# Patient Record
Sex: MALE | Race: WHITE | HISPANIC OR LATINO | Employment: OTHER | URBAN - METROPOLITAN AREA
[De-identification: names, ages, dates, MRNs, and addresses within clinical notes are randomized per-mention and may not be internally consistent; named-entity substitution may affect disease eponyms.]

---

## 2024-01-01 ENCOUNTER — APPOINTMENT (OUTPATIENT)
Dept: CT IMAGING | Facility: CLINIC | Age: 89
End: 2024-01-01
Attending: EMERGENCY MEDICINE
Payer: COMMERCIAL

## 2024-01-01 ENCOUNTER — HOSPITAL ENCOUNTER (INPATIENT)
Facility: HOSPITAL | Age: 89
LOS: 5 days | DRG: 064 | End: 2024-02-08
Attending: STUDENT IN AN ORGANIZED HEALTH CARE EDUCATION/TRAINING PROGRAM | Admitting: STUDENT IN AN ORGANIZED HEALTH CARE EDUCATION/TRAINING PROGRAM
Payer: COMMERCIAL

## 2024-01-01 ENCOUNTER — APPOINTMENT (OUTPATIENT)
Dept: MRI IMAGING | Facility: CLINIC | Age: 89
End: 2024-01-01
Attending: EMERGENCY MEDICINE
Payer: COMMERCIAL

## 2024-01-01 ENCOUNTER — LAB REQUISITION (OUTPATIENT)
Dept: LAB | Facility: CLINIC | Age: 89
End: 2024-01-01

## 2024-01-01 ENCOUNTER — APPOINTMENT (OUTPATIENT)
Dept: CT IMAGING | Facility: HOSPITAL | Age: 89
DRG: 064 | End: 2024-01-01
Attending: STUDENT IN AN ORGANIZED HEALTH CARE EDUCATION/TRAINING PROGRAM
Payer: COMMERCIAL

## 2024-01-01 ENCOUNTER — HOSPITAL ENCOUNTER (EMERGENCY)
Facility: CLINIC | Age: 89
Discharge: OTHER INSTITUTION | End: 2024-02-03
Attending: EMERGENCY MEDICINE | Admitting: EMERGENCY MEDICINE
Payer: COMMERCIAL

## 2024-01-01 VITALS
OXYGEN SATURATION: 100 % | TEMPERATURE: 98.5 F | BODY MASS INDEX: 24.61 KG/M2 | HEIGHT: 65 IN | SYSTOLIC BLOOD PRESSURE: 150 MMHG | WEIGHT: 147.71 LBS | DIASTOLIC BLOOD PRESSURE: 71 MMHG | HEART RATE: 88 BPM

## 2024-01-01 VITALS
HEIGHT: 65 IN | BODY MASS INDEX: 24.66 KG/M2 | OXYGEN SATURATION: 96 % | TEMPERATURE: 100.2 F | WEIGHT: 148 LBS | RESPIRATION RATE: 16 BRPM | SYSTOLIC BLOOD PRESSURE: 146 MMHG | HEART RATE: 79 BPM | DIASTOLIC BLOOD PRESSURE: 73 MMHG

## 2024-01-01 DIAGNOSIS — Z11.1 ENCOUNTER FOR SCREENING FOR RESPIRATORY TUBERCULOSIS: ICD-10-CM

## 2024-01-01 DIAGNOSIS — I63.9 ACUTE CVA (CEREBROVASCULAR ACCIDENT) (H): ICD-10-CM

## 2024-01-01 DIAGNOSIS — Z86.59 HISTORY OF DEMENTIA: ICD-10-CM

## 2024-01-01 DIAGNOSIS — G83.84 TODD'S PARALYSIS (POSTEPILEPTIC) (H): ICD-10-CM

## 2024-01-01 LAB
ALBUMIN SERPL BCG-MCNC: 3.3 G/DL (ref 3.5–5.2)
ALP SERPL-CCNC: 161 U/L (ref 40–150)
ALT SERPL W P-5'-P-CCNC: 41 U/L (ref 0–70)
ANION GAP SERPL CALCULATED.3IONS-SCNC: 12 MMOL/L (ref 7–15)
ANION GAP SERPL CALCULATED.3IONS-SCNC: 8 MMOL/L (ref 7–15)
APTT PPP: 37 SECONDS (ref 22–38)
AST SERPL W P-5'-P-CCNC: 38 U/L (ref 0–45)
ATRIAL RATE - MUSE: 102 BPM
BASOPHILS # BLD AUTO: 0.1 10E3/UL (ref 0–0.2)
BASOPHILS NFR BLD AUTO: 0 %
BILIRUB SERPL-MCNC: 0.6 MG/DL
BUN SERPL-MCNC: 22.7 MG/DL (ref 8–23)
BUN SERPL-MCNC: 22.9 MG/DL (ref 8–23)
CALCIUM SERPL-MCNC: 8.3 MG/DL (ref 8.8–10.2)
CALCIUM SERPL-MCNC: 8.9 MG/DL (ref 8.8–10.2)
CHLORIDE SERPL-SCNC: 102 MMOL/L (ref 98–107)
CHLORIDE SERPL-SCNC: 96 MMOL/L (ref 98–107)
CHOLEST SERPL-MCNC: 87 MG/DL
CK SERPL-CCNC: 122 U/L (ref 39–308)
CREAT SERPL-MCNC: 1.24 MG/DL (ref 0.67–1.17)
CREAT SERPL-MCNC: 1.29 MG/DL (ref 0.67–1.17)
DEPRECATED HCO3 PLAS-SCNC: 25 MMOL/L (ref 22–29)
DEPRECATED HCO3 PLAS-SCNC: 26 MMOL/L (ref 22–29)
DIASTOLIC BLOOD PRESSURE - MUSE: NORMAL MMHG
EGFRCR SERPLBLD CKD-EPI 2021: 54 ML/MIN/1.73M2
EGFRCR SERPLBLD CKD-EPI 2021: 57 ML/MIN/1.73M2
EOSINOPHIL # BLD AUTO: 0 10E3/UL (ref 0–0.7)
EOSINOPHIL NFR BLD AUTO: 0 %
ERYTHROCYTE [DISTWIDTH] IN BLOOD BY AUTOMATED COUNT: 12.7 % (ref 10–15)
ERYTHROCYTE [DISTWIDTH] IN BLOOD BY AUTOMATED COUNT: 12.8 % (ref 10–15)
GAMMA INTERFERON BACKGROUND BLD IA-ACNC: 0 IU/ML
GLUCOSE BLDC GLUCOMTR-MCNC: 133 MG/DL (ref 70–99)
GLUCOSE BLDC GLUCOMTR-MCNC: 146 MG/DL (ref 70–99)
GLUCOSE SERPL-MCNC: 124 MG/DL (ref 70–99)
GLUCOSE SERPL-MCNC: 135 MG/DL (ref 70–99)
HBA1C MFR BLD: 6.4 %
HCT VFR BLD AUTO: 38.9 % (ref 40–53)
HCT VFR BLD AUTO: 41.3 % (ref 40–53)
HDLC SERPL-MCNC: 33 MG/DL
HGB BLD-MCNC: 13.2 G/DL (ref 13.3–17.7)
HGB BLD-MCNC: 13.8 G/DL (ref 13.3–17.7)
HOLD SPECIMEN: NORMAL
IMM GRANULOCYTES # BLD: 0.1 10E3/UL
IMM GRANULOCYTES NFR BLD: 1 %
INR PPP: 1.24 (ref 0.85–1.15)
INTERPRETATION ECG - MUSE: NORMAL
LDLC SERPL CALC-MCNC: 36 MG/DL
LYMPHOCYTES # BLD AUTO: 2.1 10E3/UL (ref 0.8–5.3)
LYMPHOCYTES NFR BLD AUTO: 12 %
M TB IFN-G BLD-IMP: NEGATIVE
M TB IFN-G CD4+ BCKGRND COR BLD-ACNC: 0.81 IU/ML
MCH RBC QN AUTO: 29.3 PG (ref 26.5–33)
MCH RBC QN AUTO: 29.4 PG (ref 26.5–33)
MCHC RBC AUTO-ENTMCNC: 33.4 G/DL (ref 31.5–36.5)
MCHC RBC AUTO-ENTMCNC: 33.9 G/DL (ref 31.5–36.5)
MCV RBC AUTO: 86 FL (ref 78–100)
MCV RBC AUTO: 88 FL (ref 78–100)
MITOGEN IGNF BCKGRD COR BLD-ACNC: 0.01 IU/ML
MITOGEN IGNF BCKGRD COR BLD-ACNC: 0.01 IU/ML
MONOCYTES # BLD AUTO: 1.4 10E3/UL (ref 0–1.3)
MONOCYTES NFR BLD AUTO: 8 %
NEUTROPHILS # BLD AUTO: 13.3 10E3/UL (ref 1.6–8.3)
NEUTROPHILS NFR BLD AUTO: 79 %
NONHDLC SERPL-MCNC: 54 MG/DL
NRBC # BLD AUTO: 0 10E3/UL
NRBC BLD AUTO-RTO: 0 /100
P AXIS - MUSE: -13 DEGREES
PLATELET # BLD AUTO: 332 10E3/UL (ref 150–450)
PLATELET # BLD AUTO: 358 10E3/UL (ref 150–450)
POTASSIUM SERPL-SCNC: 4 MMOL/L (ref 3.4–5.3)
POTASSIUM SERPL-SCNC: 4.5 MMOL/L (ref 3.4–5.3)
PR INTERVAL - MUSE: 150 MS
PROT SERPL-MCNC: 7.1 G/DL (ref 6.4–8.3)
QRS DURATION - MUSE: 78 MS
QT - MUSE: 354 MS
QTC - MUSE: 461 MS
QUANTIFERON MITOGEN: 0.81 IU/ML
QUANTIFERON NIL TUBE: 0 IU/ML
QUANTIFERON TB1 TUBE: 0.01 IU/ML
QUANTIFERON TB2 TUBE: 0.01
R AXIS - MUSE: 49 DEGREES
RBC # BLD AUTO: 4.5 10E6/UL (ref 4.4–5.9)
RBC # BLD AUTO: 4.7 10E6/UL (ref 4.4–5.9)
SODIUM SERPL-SCNC: 133 MMOL/L (ref 135–145)
SODIUM SERPL-SCNC: 136 MMOL/L (ref 135–145)
SYSTOLIC BLOOD PRESSURE - MUSE: NORMAL MMHG
T AXIS - MUSE: 100 DEGREES
TRIGL SERPL-MCNC: 88 MG/DL
TROPONIN T SERPL HS-MCNC: 14 NG/L
TROPONIN T SERPL HS-MCNC: 17 NG/L
VENTRICULAR RATE- MUSE: 102 BPM
WBC # BLD AUTO: 16.9 10E3/UL (ref 4–11)
WBC # BLD AUTO: 17.2 10E3/UL (ref 4–11)

## 2024-01-01 PROCEDURE — 84484 ASSAY OF TROPONIN QUANT: CPT | Performed by: STUDENT IN AN ORGANIZED HEALTH CARE EDUCATION/TRAINING PROGRAM

## 2024-01-01 PROCEDURE — 258N000003 HC RX IP 258 OP 636: Performed by: EMERGENCY MEDICINE

## 2024-01-01 PROCEDURE — 82962 GLUCOSE BLOOD TEST: CPT

## 2024-01-01 PROCEDURE — 250N000011 HC RX IP 250 OP 636: Mod: JZ | Performed by: PSYCHIATRY & NEUROLOGY

## 2024-01-01 PROCEDURE — 250N000013 HC RX MED GY IP 250 OP 250 PS 637: Performed by: INTERNAL MEDICINE

## 2024-01-01 PROCEDURE — 80061 LIPID PANEL: CPT | Performed by: STUDENT IN AN ORGANIZED HEALTH CARE EDUCATION/TRAINING PROGRAM

## 2024-01-01 PROCEDURE — 99232 SBSQ HOSP IP/OBS MODERATE 35: CPT | Performed by: INTERNAL MEDICINE

## 2024-01-01 PROCEDURE — 999N000226 HC STATISTIC SLP IP EVAL DEFER

## 2024-01-01 PROCEDURE — 85027 COMPLETE CBC AUTOMATED: CPT | Performed by: STUDENT IN AN ORGANIZED HEALTH CARE EDUCATION/TRAINING PROGRAM

## 2024-01-01 PROCEDURE — 255N000002 HC RX 255 OP 636: Performed by: EMERGENCY MEDICINE

## 2024-01-01 PROCEDURE — 86481 TB AG RESPONSE T-CELL SUSP: CPT | Performed by: INTERNAL MEDICINE

## 2024-01-01 PROCEDURE — 99233 SBSQ HOSP IP/OBS HIGH 50: CPT | Performed by: INTERNAL MEDICINE

## 2024-01-01 PROCEDURE — 93005 ELECTROCARDIOGRAM TRACING: CPT

## 2024-01-01 PROCEDURE — 70496 CT ANGIOGRAPHY HEAD: CPT

## 2024-01-01 PROCEDURE — 250N000011 HC RX IP 250 OP 636: Performed by: EMERGENCY MEDICINE

## 2024-01-01 PROCEDURE — 250N000013 HC RX MED GY IP 250 OP 250 PS 637: Performed by: FAMILY MEDICINE

## 2024-01-01 PROCEDURE — 84484 ASSAY OF TROPONIN QUANT: CPT | Performed by: EMERGENCY MEDICINE

## 2024-01-01 PROCEDURE — 96365 THER/PROPH/DIAG IV INF INIT: CPT | Mod: 59

## 2024-01-01 PROCEDURE — 258N000003 HC RX IP 258 OP 636: Performed by: PSYCHIATRY & NEUROLOGY

## 2024-01-01 PROCEDURE — 250N000013 HC RX MED GY IP 250 OP 250 PS 637: Performed by: STUDENT IN AN ORGANIZED HEALTH CARE EDUCATION/TRAINING PROGRAM

## 2024-01-01 PROCEDURE — 36415 COLL VENOUS BLD VENIPUNCTURE: CPT | Performed by: STUDENT IN AN ORGANIZED HEALTH CARE EDUCATION/TRAINING PROGRAM

## 2024-01-01 PROCEDURE — 99418 PROLNG IP/OBS E/M EA 15 MIN: CPT | Performed by: STUDENT IN AN ORGANIZED HEALTH CARE EDUCATION/TRAINING PROGRAM

## 2024-01-01 PROCEDURE — 70450 CT HEAD/BRAIN W/O DYE: CPT

## 2024-01-01 PROCEDURE — 120N000001 HC R&B MED SURG/OB

## 2024-01-01 PROCEDURE — 99207 PR APP CREDIT; MD BILLING SHARED VISIT: CPT | Performed by: FAMILY MEDICINE

## 2024-01-01 PROCEDURE — 85730 THROMBOPLASTIN TIME PARTIAL: CPT | Performed by: EMERGENCY MEDICINE

## 2024-01-01 PROCEDURE — 99223 1ST HOSP IP/OBS HIGH 75: CPT | Performed by: PSYCHIATRY & NEUROLOGY

## 2024-01-01 PROCEDURE — 250N000011 HC RX IP 250 OP 636: Performed by: STUDENT IN AN ORGANIZED HEALTH CARE EDUCATION/TRAINING PROGRAM

## 2024-01-01 PROCEDURE — 36415 COLL VENOUS BLD VENIPUNCTURE: CPT | Performed by: INTERNAL MEDICINE

## 2024-01-01 PROCEDURE — 36415 COLL VENOUS BLD VENIPUNCTURE: CPT | Performed by: EMERGENCY MEDICINE

## 2024-01-01 PROCEDURE — 99223 1ST HOSP IP/OBS HIGH 75: CPT | Performed by: STUDENT IN AN ORGANIZED HEALTH CARE EDUCATION/TRAINING PROGRAM

## 2024-01-01 PROCEDURE — 99238 HOSP IP/OBS DSCHRG MGMT 30/<: CPT | Performed by: INTERNAL MEDICINE

## 2024-01-01 PROCEDURE — A9585 GADOBUTROL INJECTION: HCPCS | Performed by: EMERGENCY MEDICINE

## 2024-01-01 PROCEDURE — 85610 PROTHROMBIN TIME: CPT | Performed by: EMERGENCY MEDICINE

## 2024-01-01 PROCEDURE — 80053 COMPREHEN METABOLIC PANEL: CPT | Performed by: STUDENT IN AN ORGANIZED HEALTH CARE EDUCATION/TRAINING PROGRAM

## 2024-01-01 PROCEDURE — 2894A VOIDCORRECT: CPT | Performed by: INTERNAL MEDICINE

## 2024-01-01 PROCEDURE — 83036 HEMOGLOBIN GLYCOSYLATED A1C: CPT | Performed by: STUDENT IN AN ORGANIZED HEALTH CARE EDUCATION/TRAINING PROGRAM

## 2024-01-01 PROCEDURE — P9604 ONE-WAY ALLOW PRORATED TRIP: HCPCS | Performed by: INTERNAL MEDICINE

## 2024-01-01 PROCEDURE — 99285 EMERGENCY DEPT VISIT HI MDM: CPT | Mod: 25

## 2024-01-01 PROCEDURE — 70553 MRI BRAIN STEM W/O & W/DYE: CPT

## 2024-01-01 PROCEDURE — 85025 COMPLETE CBC W/AUTO DIFF WBC: CPT | Performed by: EMERGENCY MEDICINE

## 2024-01-01 PROCEDURE — 250N000009 HC RX 250: Performed by: EMERGENCY MEDICINE

## 2024-01-01 PROCEDURE — 80048 BASIC METABOLIC PNL TOTAL CA: CPT | Performed by: EMERGENCY MEDICINE

## 2024-01-01 PROCEDURE — 82550 ASSAY OF CK (CPK): CPT | Performed by: STUDENT IN AN ORGANIZED HEALTH CARE EDUCATION/TRAINING PROGRAM

## 2024-01-01 RX ORDER — ASPIRIN 81 MG/1
81 TABLET, CHEWABLE ORAL DAILY
Status: DISCONTINUED | OUTPATIENT
Start: 2024-01-01 | End: 2024-01-01

## 2024-01-01 RX ORDER — GADOBUTROL 604.72 MG/ML
7 INJECTION INTRAVENOUS ONCE
Status: COMPLETED | OUTPATIENT
Start: 2024-01-01 | End: 2024-01-01

## 2024-01-01 RX ORDER — MORPHINE SULFATE 10 MG/5ML
5 SOLUTION ORAL
Status: DISCONTINUED | OUTPATIENT
Start: 2024-01-01 | End: 2024-01-01 | Stop reason: HOSPADM

## 2024-01-01 RX ORDER — ASPIRIN 81 MG/1
81 TABLET ORAL DAILY
Status: DISCONTINUED | OUTPATIENT
Start: 2024-01-01 | End: 2024-01-01

## 2024-01-01 RX ORDER — LORAZEPAM 1 MG/1
1 TABLET ORAL
Status: DISCONTINUED | OUTPATIENT
Start: 2024-01-01 | End: 2024-01-01

## 2024-01-01 RX ORDER — SALIVA STIMULANT COMB. NO.3
2 SPRAY, NON-AEROSOL (ML) MUCOUS MEMBRANE
Status: DISCONTINUED | OUTPATIENT
Start: 2024-01-01 | End: 2024-01-01 | Stop reason: HOSPADM

## 2024-01-01 RX ORDER — NALOXONE HYDROCHLORIDE 0.4 MG/ML
0.1 INJECTION, SOLUTION INTRAMUSCULAR; INTRAVENOUS; SUBCUTANEOUS
Status: DISCONTINUED | OUTPATIENT
Start: 2024-01-01 | End: 2024-01-01

## 2024-01-01 RX ORDER — ACETAMINOPHEN 650 MG/1
650 SUPPOSITORY RECTAL EVERY 6 HOURS PRN
Status: DISCONTINUED | OUTPATIENT
Start: 2024-01-01 | End: 2024-01-01

## 2024-01-01 RX ORDER — NALOXONE HYDROCHLORIDE 0.4 MG/ML
0.4 INJECTION, SOLUTION INTRAMUSCULAR; INTRAVENOUS; SUBCUTANEOUS
Status: DISCONTINUED | OUTPATIENT
Start: 2024-01-01 | End: 2024-01-01 | Stop reason: HOSPADM

## 2024-01-01 RX ORDER — MORPHINE SULFATE 20 MG/ML
10 SOLUTION ORAL EVERY 4 HOURS
Status: DISCONTINUED | OUTPATIENT
Start: 2024-01-01 | End: 2024-01-01 | Stop reason: HOSPADM

## 2024-01-01 RX ORDER — ATORVASTATIN CALCIUM 40 MG/1
80 TABLET, FILM COATED ORAL EVERY EVENING
Status: DISCONTINUED | OUTPATIENT
Start: 2024-01-01 | End: 2024-01-01

## 2024-01-01 RX ORDER — LIDOCAINE 40 MG/G
CREAM TOPICAL
Status: DISCONTINUED | OUTPATIENT
Start: 2024-01-01 | End: 2024-01-01

## 2024-01-01 RX ORDER — POLYETHYLENE GLYCOL 3350 17 G/17G
17 POWDER, FOR SOLUTION ORAL 2 TIMES DAILY PRN
Status: DISCONTINUED | OUTPATIENT
Start: 2024-01-01 | End: 2024-01-01 | Stop reason: HOSPADM

## 2024-01-01 RX ORDER — CARBOXYMETHYLCELLULOSE SODIUM 5 MG/ML
1-2 SOLUTION/ DROPS OPHTHALMIC
Status: DISCONTINUED | OUTPATIENT
Start: 2024-01-01 | End: 2024-01-01 | Stop reason: HOSPADM

## 2024-01-01 RX ORDER — MEMANTINE HYDROCHLORIDE 10 MG/1
10 TABLET ORAL 2 TIMES DAILY
COMMUNITY
Start: 2023-01-01

## 2024-01-01 RX ORDER — OLANZAPINE 5 MG/1
5 TABLET, ORALLY DISINTEGRATING ORAL EVERY 6 HOURS PRN
Status: DISCONTINUED | OUTPATIENT
Start: 2024-01-01 | End: 2024-01-01 | Stop reason: HOSPADM

## 2024-01-01 RX ORDER — ASPIRIN 81 MG/1
81 TABLET, CHEWABLE ORAL DAILY
Status: DISCONTINUED | OUTPATIENT
Start: 2024-01-01 | End: 2024-01-01 | Stop reason: DRUGHIGH

## 2024-01-01 RX ORDER — LORAZEPAM 1 MG/1
1 TABLET ORAL EVERY 4 HOURS
Status: DISCONTINUED | OUTPATIENT
Start: 2024-01-01 | End: 2024-01-01

## 2024-01-01 RX ORDER — LIDOCAINE 40 MG/G
CREAM TOPICAL
Status: DISCONTINUED | OUTPATIENT
Start: 2024-01-01 | End: 2024-01-01 | Stop reason: HOSPADM

## 2024-01-01 RX ORDER — ASPIRIN 300 MG/1
300 SUPPOSITORY RECTAL DAILY
Status: DISCONTINUED | OUTPATIENT
Start: 2024-01-01 | End: 2024-01-01

## 2024-01-01 RX ORDER — MORPHINE SULFATE 20 MG/ML
10 SOLUTION ORAL
Status: DISCONTINUED | OUTPATIENT
Start: 2024-01-01 | End: 2024-01-01 | Stop reason: HOSPADM

## 2024-01-01 RX ORDER — CLOPIDOGREL BISULFATE 75 MG/1
75 TABLET ORAL EVERY EVENING
COMMUNITY
Start: 2023-01-01

## 2024-01-01 RX ORDER — NALOXONE HYDROCHLORIDE 0.4 MG/ML
0.2 INJECTION, SOLUTION INTRAMUSCULAR; INTRAVENOUS; SUBCUTANEOUS
Status: DISCONTINUED | OUTPATIENT
Start: 2024-01-01 | End: 2024-01-01 | Stop reason: HOSPADM

## 2024-01-01 RX ORDER — ACETAMINOPHEN 500 MG
500-1000 TABLET ORAL 2 TIMES DAILY PRN
COMMUNITY

## 2024-01-01 RX ORDER — BISACODYL 10 MG
10 SUPPOSITORY, RECTAL RECTAL
Status: DISCONTINUED | OUTPATIENT
Start: 2024-01-01 | End: 2024-01-01 | Stop reason: HOSPADM

## 2024-01-01 RX ORDER — ROSUVASTATIN CALCIUM 40 MG/1
40 TABLET, COATED ORAL AT BEDTIME
COMMUNITY
Start: 2023-01-01

## 2024-01-01 RX ORDER — ATROPINE SULFATE 10 MG/ML
2 SOLUTION/ DROPS OPHTHALMIC EVERY 4 HOURS PRN
Status: DISCONTINUED | OUTPATIENT
Start: 2024-01-01 | End: 2024-01-01 | Stop reason: HOSPADM

## 2024-01-01 RX ORDER — LEVETIRACETAM 100 MG/ML
500 SOLUTION ORAL 2 TIMES DAILY
Status: DISCONTINUED | OUTPATIENT
Start: 2024-01-01 | End: 2024-01-01

## 2024-01-01 RX ORDER — SENNOSIDES A AND B 8.6 MG/1
8.6 TABLET, FILM COATED ORAL 2 TIMES DAILY
COMMUNITY
Start: 2024-01-01

## 2024-01-01 RX ORDER — POLYETHYLENE GLYCOL 3350 17 G/17G
1 POWDER, FOR SOLUTION ORAL DAILY PRN
COMMUNITY

## 2024-01-01 RX ORDER — MORPHINE SULFATE 20 MG/ML
5 SOLUTION ORAL
Status: DISCONTINUED | OUTPATIENT
Start: 2024-01-01 | End: 2024-01-01 | Stop reason: HOSPADM

## 2024-01-01 RX ORDER — LEVOTHYROXINE SODIUM 50 UG/1
50 TABLET ORAL
COMMUNITY
Start: 2023-01-01

## 2024-01-01 RX ORDER — PROCHLORPERAZINE 25 MG
12.5 SUPPOSITORY, RECTAL RECTAL EVERY 12 HOURS PRN
Status: DISCONTINUED | OUTPATIENT
Start: 2024-01-01 | End: 2024-01-01 | Stop reason: HOSPADM

## 2024-01-01 RX ORDER — LEVETIRACETAM 500 MG/1
500 TABLET ORAL 2 TIMES DAILY
COMMUNITY
Start: 2023-01-01

## 2024-01-01 RX ORDER — LEVETIRACETAM 100 MG/ML
750 SOLUTION ORAL 2 TIMES DAILY
Status: DISCONTINUED | OUTPATIENT
Start: 2024-01-01 | End: 2024-01-01

## 2024-01-01 RX ORDER — BISACODYL 10 MG
10 SUPPOSITORY, RECTAL RECTAL DAILY PRN
Status: DISCONTINUED | OUTPATIENT
Start: 2024-01-01 | End: 2024-01-01 | Stop reason: HOSPADM

## 2024-01-01 RX ORDER — LORAZEPAM 2 MG/ML
1 CONCENTRATE ORAL EVERY 4 HOURS
Status: DISCONTINUED | OUTPATIENT
Start: 2024-01-01 | End: 2024-01-01 | Stop reason: HOSPADM

## 2024-01-01 RX ORDER — ACETAMINOPHEN 325 MG/1
650 TABLET ORAL EVERY 4 HOURS PRN
Status: DISCONTINUED | OUTPATIENT
Start: 2024-01-01 | End: 2024-01-01 | Stop reason: HOSPADM

## 2024-01-01 RX ORDER — ACETAMINOPHEN 650 MG/1
650 SUPPOSITORY RECTAL EVERY 4 HOURS PRN
Status: DISCONTINUED | OUTPATIENT
Start: 2024-01-01 | End: 2024-01-01 | Stop reason: HOSPADM

## 2024-01-01 RX ORDER — IOPAMIDOL 755 MG/ML
500 INJECTION, SOLUTION INTRAVASCULAR ONCE
Status: COMPLETED | OUTPATIENT
Start: 2024-01-01 | End: 2024-01-01

## 2024-01-01 RX ORDER — LORAZEPAM 2 MG/ML
1 INJECTION INTRAMUSCULAR
Status: DISCONTINUED | OUTPATIENT
Start: 2024-01-01 | End: 2024-01-01

## 2024-01-01 RX ORDER — LEVETIRACETAM 15 MG/ML
1500 INJECTION INTRAVASCULAR ONCE
Status: DISCONTINUED | OUTPATIENT
Start: 2024-01-01 | End: 2024-01-01

## 2024-01-01 RX ORDER — PROCHLORPERAZINE MALEATE 5 MG
5 TABLET ORAL EVERY 6 HOURS PRN
Status: DISCONTINUED | OUTPATIENT
Start: 2024-01-01 | End: 2024-01-01 | Stop reason: HOSPADM

## 2024-01-01 RX ORDER — ONDANSETRON 2 MG/ML
4 INJECTION INTRAMUSCULAR; INTRAVENOUS EVERY 6 HOURS PRN
Status: DISCONTINUED | OUTPATIENT
Start: 2024-01-01 | End: 2024-01-01

## 2024-01-01 RX ORDER — ONDANSETRON 4 MG/1
4 TABLET, ORALLY DISINTEGRATING ORAL EVERY 6 HOURS PRN
Status: DISCONTINUED | OUTPATIENT
Start: 2024-01-01 | End: 2024-01-01 | Stop reason: HOSPADM

## 2024-01-01 RX ORDER — ASPIRIN 81 MG/1
81 TABLET ORAL DAILY
Status: DISCONTINUED | OUTPATIENT
Start: 2024-01-01 | End: 2024-01-01 | Stop reason: DRUGHIGH

## 2024-01-01 RX ORDER — MORPHINE SULFATE 10 MG/5ML
10 SOLUTION ORAL
Status: DISCONTINUED | OUTPATIENT
Start: 2024-01-01 | End: 2024-01-01 | Stop reason: HOSPADM

## 2024-01-01 RX ORDER — ACETAMINOPHEN 500 MG
500-1000 TABLET ORAL 3 TIMES DAILY
COMMUNITY

## 2024-01-01 RX ORDER — NALOXONE HYDROCHLORIDE 0.4 MG/ML
0.2 INJECTION, SOLUTION INTRAMUSCULAR; INTRAVENOUS; SUBCUTANEOUS
Status: DISCONTINUED | OUTPATIENT
Start: 2024-01-01 | End: 2024-01-01

## 2024-01-01 RX ORDER — MINERAL OIL/HYDROPHIL PETROLAT
OINTMENT (GRAM) TOPICAL
Status: DISCONTINUED | OUTPATIENT
Start: 2024-01-01 | End: 2024-01-01 | Stop reason: HOSPADM

## 2024-01-01 RX ADMIN — ONDANSETRON 4 MG: 4 TABLET, ORALLY DISINTEGRATING ORAL at 11:16

## 2024-01-01 RX ADMIN — MORPHINE SULFATE 5 MG: 20 SOLUTION ORAL at 19:15

## 2024-01-01 RX ADMIN — LORAZEPAM 1 MG: 2 CONCENTRATE ORAL at 19:59

## 2024-01-01 RX ADMIN — LORAZEPAM 1 MG: 2 CONCENTRATE ORAL at 03:53

## 2024-01-01 RX ADMIN — MORPHINE SULFATE 10 MG: 20 SOLUTION ORAL at 01:36

## 2024-01-01 RX ADMIN — ACETAMINOPHEN 650 MG: 650 SUPPOSITORY RECTAL at 19:24

## 2024-01-01 RX ADMIN — MORPHINE SULFATE 5 MG: 20 SOLUTION ORAL at 16:38

## 2024-01-01 RX ADMIN — LORAZEPAM 1 MG: 2 CONCENTRATE ORAL at 15:46

## 2024-01-01 RX ADMIN — ACETAMINOPHEN 650 MG: 650 SUPPOSITORY RECTAL at 10:49

## 2024-01-01 RX ADMIN — MORPHINE SULFATE 5 MG: 10 SOLUTION ORAL at 18:21

## 2024-01-01 RX ADMIN — MORPHINE SULFATE 5 MG: 20 SOLUTION ORAL at 11:16

## 2024-01-01 RX ADMIN — SODIUM CHLORIDE 3000 MG: 9 INJECTION, SOLUTION INTRAVENOUS at 21:30

## 2024-01-01 RX ADMIN — MORPHINE SULFATE 10 MG: 20 SOLUTION ORAL at 13:58

## 2024-01-01 RX ADMIN — MORPHINE SULFATE 10 MG: 20 SOLUTION ORAL at 18:11

## 2024-01-01 RX ADMIN — MORPHINE SULFATE 10 MG: 20 SOLUTION ORAL at 21:23

## 2024-01-01 RX ADMIN — MORPHINE SULFATE 10 MG: 20 SOLUTION ORAL at 05:26

## 2024-01-01 RX ADMIN — LORAZEPAM 1 MG: 2 CONCENTRATE ORAL at 10:09

## 2024-01-01 RX ADMIN — MORPHINE SULFATE 5 MG: 10 SOLUTION ORAL at 19:46

## 2024-01-01 RX ADMIN — LEVETIRACETAM 750 MG: 100 INJECTION, SOLUTION INTRAVENOUS at 10:50

## 2024-01-01 RX ADMIN — MORPHINE SULFATE 10 MG: 20 SOLUTION ORAL at 10:10

## 2024-01-01 RX ADMIN — ASPIRIN 300 MG: 300 SUPPOSITORY RECTAL at 11:22

## 2024-01-01 RX ADMIN — LEVETIRACETAM 500 MG: 100 SOLUTION ORAL at 08:06

## 2024-01-01 RX ADMIN — GADOBUTROL 65 ML: 604.72 INJECTION INTRAVENOUS at 20:50

## 2024-01-01 RX ADMIN — LORAZEPAM 1 MG: 2 CONCENTRATE ORAL at 00:00

## 2024-01-01 RX ADMIN — MORPHINE SULFATE 5 MG: 20 SOLUTION ORAL at 02:37

## 2024-01-01 RX ADMIN — SODIUM CHLORIDE 80 ML: 9 INJECTION, SOLUTION INTRAVENOUS at 19:27

## 2024-01-01 RX ADMIN — MORPHINE SULFATE 5 MG: 20 SOLUTION ORAL at 06:49

## 2024-01-01 RX ADMIN — LORAZEPAM 1 MG: 2 CONCENTRATE ORAL at 10:17

## 2024-01-01 RX ADMIN — MORPHINE SULFATE 5 MG: 20 SOLUTION ORAL at 17:10

## 2024-01-01 RX ADMIN — IOPAMIDOL 67 ML: 755 INJECTION, SOLUTION INTRAVENOUS at 19:27

## 2024-01-01 RX ADMIN — MORPHINE SULFATE 10 MG: 20 SOLUTION ORAL at 10:17

## 2024-01-01 RX ADMIN — MORPHINE SULFATE 5 MG: 10 SOLUTION ORAL at 11:35

## 2024-01-01 ASSESSMENT — ACTIVITIES OF DAILY LIVING (ADL)
ADLS_ACUITY_SCORE: 57
ADLS_ACUITY_SCORE: 49
ADLS_ACUITY_SCORE: 53
DEPENDENT_IADLS:: CLEANING;COOKING;LAUNDRY;SHOPPING;MEAL PREPARATION;MONEY MANAGEMENT;MEDICATION MANAGEMENT;TRANSPORTATION
ADLS_ACUITY_SCORE: 43
ADLS_ACUITY_SCORE: 53
ADLS_ACUITY_SCORE: 57
ADLS_ACUITY_SCORE: 47
ADLS_ACUITY_SCORE: 43
ADLS_ACUITY_SCORE: 57
ADLS_ACUITY_SCORE: 43
ADLS_ACUITY_SCORE: 57
ADLS_ACUITY_SCORE: 49
ADLS_ACUITY_SCORE: 51
ADLS_ACUITY_SCORE: 53
ADLS_ACUITY_SCORE: 57
ADLS_ACUITY_SCORE: 56
ADLS_ACUITY_SCORE: 57
ADLS_ACUITY_SCORE: 51
ADLS_ACUITY_SCORE: 35
ADLS_ACUITY_SCORE: 57
ADLS_ACUITY_SCORE: 56
ADLS_ACUITY_SCORE: 56
ADLS_ACUITY_SCORE: 57
ADLS_ACUITY_SCORE: 49
ADLS_ACUITY_SCORE: 51
ADLS_ACUITY_SCORE: 57
ADLS_ACUITY_SCORE: 35
ADLS_ACUITY_SCORE: 57
ADLS_ACUITY_SCORE: 56
ADLS_ACUITY_SCORE: 43
ADLS_ACUITY_SCORE: 57
ADLS_ACUITY_SCORE: 51
ADLS_ACUITY_SCORE: 57
ADLS_ACUITY_SCORE: 57
ADLS_ACUITY_SCORE: 48
ADLS_ACUITY_SCORE: 57
ADLS_ACUITY_SCORE: 47
ADLS_ACUITY_SCORE: 48
ADLS_ACUITY_SCORE: 53
ADLS_ACUITY_SCORE: 57
ADLS_ACUITY_SCORE: 51
ADLS_ACUITY_SCORE: 57
ADLS_ACUITY_SCORE: 35
ADLS_ACUITY_SCORE: 47
ADLS_ACUITY_SCORE: 53
ADLS_ACUITY_SCORE: 48
ADLS_ACUITY_SCORE: 57
ADLS_ACUITY_SCORE: 47
ADLS_ACUITY_SCORE: 47
ADLS_ACUITY_SCORE: 57
ADLS_ACUITY_SCORE: 35
ADLS_ACUITY_SCORE: 49
ADLS_ACUITY_SCORE: 48
ADLS_ACUITY_SCORE: 57
ADLS_ACUITY_SCORE: 57
ADLS_ACUITY_SCORE: 56
ADLS_ACUITY_SCORE: 48
ADLS_ACUITY_SCORE: 57
ADLS_ACUITY_SCORE: 56
ADLS_ACUITY_SCORE: 47
ADLS_ACUITY_SCORE: 57
ADLS_ACUITY_SCORE: 47

## 2024-02-03 PROBLEM — I63.9 CVA (CEREBRAL VASCULAR ACCIDENT) (H): Status: ACTIVE | Noted: 2024-01-01

## 2024-02-03 NOTE — PROGRESS NOTES
Physical Therapy: Orders received. Chart reviewed and discussed with care team.? Physical Therapy not indicated due to change in medical status - pt on comfort cares.? Defer discharge recommendations to Care Team.? Will complete orders.

## 2024-02-03 NOTE — PROGRESS NOTES
Patient obtunded.  Will briefly open eyes when writer says their name but no other response.  Patient unable to follow commands.  Slightly elevated temp but room was warm so made improvements to environment to lower temp and patients temp responding appropriately.  Several urinary incontinence episodes through night.

## 2024-02-03 NOTE — CONSULTS
NEUROLOGY CONSULTATION NOTE     Juan Miguel Campbell,  1935, MRN [id number] Date: 2/3/2024     [facility]   Code status:  No CPR- Do NOT Intubate   PCP: Tahir Petty, [phone]      ASSESSMENT & PLAN   Diagnosis code: Stroke    Stroke  History of recent COVID infection  Rule out seizures    Head CT from early January negative for stroke  MRI brain from last evening shows a large right MCA infarct  CTA shows 70% narrowing of the right ICA along with marked narrowing of the right M2 segment with opacified branches on the right side  Echocardiogram/cardiac monitoring  Lipid panel and statin  Aspirin and Plavix  Continue Keppra 500 mg home dose  EEG  PT/OT/ST    Since family has decided to transition patient to hospice/comfort care we will hold off on the above testing.  Patient should be continued on Keppra 500 mg twice daily if possible.  He is unable to swallow and if IV medication is not an option and could consider using sublingual lorazepam for seizure prevention.    Will sign off.  Please call with any further questions.       CC:- Stroke     HISTORY OF PRESENT ILLNESS     We have been requested by French Sosa to evaluate Juan Miguel Campbell who is a 88 year old  male for evaluation of stroke.  This is a 88-year-old male with history of stroke, coronary artery disease, dementia, hyperlipidemia, hypertension, hypothyroidism who presented to the hospital with increasing confusion aspiration and fall.  This was earlier in January.  He at that time was diagnosed to have COVID.  He was discharged to transitional care unit on .  Yesterday around 3 PM he was noted to have new left-sided weakness with reduced responsiveness.  He was brought to the [facility] with initial concern for seizures though MRI showed a large right MCA distribution infarct.  Neurology's been consulted for further evaluation.  Patient was recommended to have an EEG that this could not be arranged  overnight.  Patient's Keppra dose at baseline was increased further.  Family this morning has decided to transition him to hospice.     PAST MEDICAL & SURGICAL HISTORY     Medical History  History of stroke, hyperlipidemia, seizure, dementia, constipation    Surgical History  No past surgical history on file.     SOCIAL HISTORY     No history of drug use or smoking or alcohol use recently.     FAMILY HISTORY     Reviewed, and noncontributory.     ALLERGIES     Allergies   Allergen Reactions     Aspirin-Dipyridamole Er      Donepezil      Sulfa Antibiotics      Tramadol Hallucination        REVIEW OF SYSTEMS     Unable to do review of systems with the patient due to altered mental status.     HOME & HOSPITAL MEDICATIONS     Prior to Admission Medications  Medications Prior to Admission   Medication Sig Dispense Refill Last Dose     acetaminophen (TYLENOL) 500 MG tablet Take 500-1,000 mg by mouth 3 times daily   2/2/2024 at AM     acetaminophen (TYLENOL) 500 MG tablet Take 500-1,000 mg by mouth 2 times daily as needed for pain (Arthritis)   Unknown at PRN     clopidogrel (PLAVIX) 75 MG tablet Take 75 mg by mouth every evening   2/1/2024 at PM     INULIN PO Take 1 chew tab by mouth daily   2/2/2024 at AM     levETIRAcetam (KEPPRA) 500 MG tablet Take 500 mg by mouth 2 times daily   2/1/2024 at PM     levothyroxine (SYNTHROID/LEVOTHROID) 50 MCG tablet Take 50 mcg by mouth daily before breakfast   2/2/2024 at AM     memantine (NAMENDA) 10 MG tablet Take 10 mg by mouth 2 times daily   2/2/2024 at AM     omeprazole (PRILOSEC) 20 MG DR capsule Take 20 mg by mouth daily as needed (GI upset)   Unknown at PRN     polyethylene glycol (MIRALAX) 17 GM/Dose powder Take 1 Scoop by mouth daily as needed for constipation   Unknown at PRN     rosuvastatin (CRESTOR) 40 MG tablet Take 40 mg by mouth at bedtime   2/1/2024 at HS     senna (SENOKOT) 8.6 MG tablet Take 8.6 mg by mouth 2 times daily   2/2/2024 at AM       Hospital  "Medications    [START ON 2/4/2024] aspirin  81 mg Oral Daily    Or     [START ON 2/4/2024] aspirin  81 mg Rectal Daily     levETIRAcetam  500 mg Oral BID     sodium chloride (PF)  3 mL Intracatheter Q8H     sodium chloride (PF)  3 mL Intracatheter Q8H        PHYSICAL EXAM     Vital signs  Temp:  [99.1  F (37.3  C)-101  F (38.3  C)] 100.5  F (38.1  C)  Pulse:  [] 89  Resp:  [16-20] 20  BP: (116-157)/(66-83) 136/77  SpO2:  [89 %-100 %] 99 %    PHYSICAL EXAMINATION  VITALS: /77 (BP Location: Right arm)   Pulse 89   Temp (!) 100.5  F (38.1  C) (Axillary)   Resp 20   Ht 1.651 m (5' 5\")   Wt 67 kg (147 lb 11.3 oz)   SpO2 99%   BMI 24.58 kg/m    GENERAL -Health appearing, No apparent distress  EYES- No scleral icterus, no eyelid droop, Pupils - see Neuro section  HEENT - Normocephalic, atraumatic, Hearing limited possibly due to altered mental status; oral mucosa moist and pink in color. External Ears and nose intact.   Neck - supple   PULM - Good spontaneous respiratory effort;   CV- Pedal pulses present with no peripheral edema/ No significant varicosities.  MSK- Gait - see Neuro section; Strength and tone- see Neuro section; Range of motion grossly intact.  PSYCH -somnolent though cooperative and not agitated.    Neurological  Mental status - Patient is somnolent with minimal eye-opening to voice/sternal rub.  Unable to follow commands or speak.  Cranial nerves - Pupils are symmetric; EOM partially I, VFIT, NLF symmetric  Motor -antigravity in all 4 extremities.  Formal motor testing not possible due to altered mental status.  Tone - Tone is symmetric bilaterally in upper and lower extremities.  Reflexes -toes are mute.  Sensation -moves all 4 extremity to painful stimulation.  Coordination - Finger to nose and heel to shin-does not follow commands.  Gait and station --unable to safely ambulate.  Formal gait testing cannot be done due to safety concerns from ongoing medical issues.       DIAGNOSTIC " STUDIES     Pertinent Radiology   CT head  IMPRESSION:  1.  Moderate to large area of acute/subacute ischemia in right MCA distribution with several small curvilinear hyperdensities throughout. These probably represent scattered intravascular thromboses; petechial hemorrhage cannot be completely excluded.   2.  Chronic ischemic changes, as described.  3.  Moderate atrophy.    HEAD CTA:   1.  Marked narrowing proximal aspect one of the right M2 segments. Slight asymmetry in the appearance of MCA distributions with slightly less opacified branches on the right.   2.  Moderate atherosclerotic changes cavernous and supraclinoid ICAs bilaterally.   3.  Short segment marked narrowing midportion of right A2.  4.  Short segment moderate narrowing right P2.     NECK CTA:  1.  Approximately 70% narrowing proximal right ICA. Correlation with carotid ultrasound recommended.  2.  Approximately 50-60% narrowing proximal left ICA.  3.  No definite hemodynamically significant narrowing vertebral arteries.    MRI brain  IMPRESSION:  1.  Large subacute right MCA inferior division infarct with petechial hemorrhage. No edgar hematoma or midline shift.  2.  Small subacute/chronic left corona radiata infarct in a region of chronic microvascular ischemic change.  3.  Chronic left MCA inferior division infarct involving the supramarginal gyrus.  4.  Thin right convexity subdural collection.    IMPRESSION:  1.  Stable acute/subacute right MCA territory infarct with low-attenuation changes/gyral edema and areas of petechial hemorrhage involving the right frontal/parietal/temporal lobe infarct. Infarct territory and mass effect remain stable compared to MRI   scan of 02/02/2024 with right to left shift of 3 mm.     2.  Thin low-attenuation subdural fluid collection overlying the right cerebral convexity measuring up to 3 mm remains unchanged.     3.  Small acute/subacute left corona radiata infarct unchanged.     4.  Multifocal chronic  infarcts elsewhere in the deep white matter both cerebral hemispheres.    Component      Latest Ref Rng 2/3/2024  1:32 AM   Cholesterol      <200 mg/dL 87    Triglycerides      <150 mg/dL 88    HDL Cholesterol      >=40 mg/dL 33 (L)    LDL Cholesterol Calculated      <=100 mg/dL 36    Non HDL Cholesterol      <130 mg/dL 54    Hemoglobin A1C      <5.7 % 6.4 (H)       Legend:  (L) Low  (H) High    Recent Results (from the past 24 hour(s))   Glucose by meter    Collection Time: 02/02/24  7:19 PM   Result Value Ref Range    GLUCOSE BY METER POCT 133 (H) 70 - 99 mg/dL   EKG 12-lead, tracing only    Collection Time: 02/02/24  7:23 PM   Result Value Ref Range    Systolic Blood Pressure  mmHg    Diastolic Blood Pressure  mmHg    Ventricular Rate 102 BPM    Atrial Rate 102 BPM    NY Interval 150 ms    QRS Duration 78 ms     ms    QTc 461 ms    P Axis -13 degrees    R AXIS 49 degrees    T Axis 100 degrees    Interpretation ECG       Sinus tachycardia  Anteroseptal infarct , age undetermined  Abnormal ECG  No previous ECGs available     Basic metabolic panel    Collection Time: 02/02/24  7:25 PM   Result Value Ref Range    Sodium 133 (L) 135 - 145 mmol/L    Potassium 4.5 3.4 - 5.3 mmol/L    Chloride 96 (L) 98 - 107 mmol/L    Carbon Dioxide (CO2) 25 22 - 29 mmol/L    Anion Gap 12 7 - 15 mmol/L    Urea Nitrogen 22.9 8.0 - 23.0 mg/dL    Creatinine 1.29 (H) 0.67 - 1.17 mg/dL    GFR Estimate 54 (L) >60 mL/min/1.73m2    Calcium 8.9 8.8 - 10.2 mg/dL    Glucose 135 (H) 70 - 99 mg/dL   INR    Collection Time: 02/02/24  7:25 PM   Result Value Ref Range    INR 1.24 (H) 0.85 - 1.15   Partial thromboplastin time    Collection Time: 02/02/24  7:25 PM   Result Value Ref Range    aPTT 37 22 - 38 Seconds   Troponin T, High Sensitivity    Collection Time: 02/02/24  7:25 PM   Result Value Ref Range    Troponin T, High Sensitivity 14 <=22 ng/L   CBC with platelets and differential    Collection Time: 02/02/24  7:25 PM   Result Value Ref  Range    WBC Count 16.9 (H) 4.0 - 11.0 10e3/uL    RBC Count 4.70 4.40 - 5.90 10e6/uL    Hemoglobin 13.8 13.3 - 17.7 g/dL    Hematocrit 41.3 40.0 - 53.0 %    MCV 88 78 - 100 fL    MCH 29.4 26.5 - 33.0 pg    MCHC 33.4 31.5 - 36.5 g/dL    RDW 12.7 10.0 - 15.0 %    Platelet Count 358 150 - 450 10e3/uL    % Neutrophils 79 %    % Lymphocytes 12 %    % Monocytes 8 %    % Eosinophils 0 %    % Basophils 0 %    % Immature Granulocytes 1 %    NRBCs per 100 WBC 0 <1 /100    Absolute Neutrophils 13.3 (H) 1.6 - 8.3 10e3/uL    Absolute Lymphocytes 2.1 0.8 - 5.3 10e3/uL    Absolute Monocytes 1.4 (H) 0.0 - 1.3 10e3/uL    Absolute Eosinophils 0.0 0.0 - 0.7 10e3/uL    Absolute Basophils 0.1 0.0 - 0.2 10e3/uL    Absolute Immature Granulocytes 0.1 <=0.4 10e3/uL    Absolute NRBCs 0.0 10e3/uL   Extra Red Top Tube    Collection Time: 02/02/24  7:26 PM   Result Value Ref Range    Hold Specimen JIC    Troponin T, High Sensitivity    Collection Time: 02/03/24  1:32 AM   Result Value Ref Range    Troponin T, High Sensitivity 17 <=22 ng/L   Hemoglobin A1c    Collection Time: 02/03/24  1:32 AM   Result Value Ref Range    Hemoglobin A1C 6.4 (H) <5.7 %   Lipid panel reflex to direct LDL: Non-fasting    Collection Time: 02/03/24  1:32 AM   Result Value Ref Range    Cholesterol 87 <200 mg/dL    Triglycerides 88 <150 mg/dL    Direct Measure HDL 33 (L) >=40 mg/dL    LDL Cholesterol Calculated 36 <=100 mg/dL    Non HDL Cholesterol 54 <130 mg/dL   CK total    Collection Time: 02/03/24  1:32 AM   Result Value Ref Range     39 - 308 U/L   Comprehensive metabolic panel    Collection Time: 02/03/24  6:58 AM   Result Value Ref Range    Sodium 136 135 - 145 mmol/L    Potassium 4.0 3.4 - 5.3 mmol/L    Carbon Dioxide (CO2) 26 22 - 29 mmol/L    Anion Gap 8 7 - 15 mmol/L    Urea Nitrogen 22.7 8.0 - 23.0 mg/dL    Creatinine 1.24 (H) 0.67 - 1.17 mg/dL    GFR Estimate 57 (L) >60 mL/min/1.73m2    Calcium 8.3 (L) 8.8 - 10.2 mg/dL    Chloride 102 98 - 107 mmol/L     Glucose 124 (H) 70 - 99 mg/dL    Alkaline Phosphatase 161 (H) 40 - 150 U/L    AST 38 0 - 45 U/L    ALT 41 0 - 70 U/L    Protein Total 7.1 6.4 - 8.3 g/dL    Albumin 3.3 (L) 3.5 - 5.2 g/dL    Bilirubin Total 0.6 <=1.2 mg/dL   CBC with platelets    Collection Time: 02/03/24  6:58 AM   Result Value Ref Range    WBC Count 17.2 (H) 4.0 - 11.0 10e3/uL    RBC Count 4.50 4.40 - 5.90 10e6/uL    Hemoglobin 13.2 (L) 13.3 - 17.7 g/dL    Hematocrit 38.9 (L) 40.0 - 53.0 %    MCV 86 78 - 100 fL    MCH 29.3 26.5 - 33.0 pg    MCHC 33.9 31.5 - 36.5 g/dL    RDW 12.8 10.0 - 15.0 %    Platelet Count 332 150 - 450 10e3/uL   Glucose by meter    Collection Time: 02/03/24 11:05 AM   Result Value Ref Range    GLUCOSE BY METER POCT 146 (H) 70 - 99 mg/dL       Total time spent for face to face visit, reviewing labs/imaging studies, counseling and coordination of care was: Over 80 min More than 50% of this time was spent on counseling and coordination of care.    Reviewing chart.  Patient new to me.  Discussion regarding hospice and comfort care and stroke with family.  Discussion with hospitalist.  Discussion with nursing staff and plan.  Discussion with pharmacist and Keppra dosing.  Reviewing imaging studies.    Lee Lees MD  Neurologist  [facility]  Tel:- [phone]

## 2024-02-03 NOTE — PROGRESS NOTES
Transfer request summary note:    Call from [facility], unknown last normal, has large R MCA ischemic acute to subacute CVA, with petechial hemorrhages, among other chronic CVA. Transfer to neuro, hx of seizures. Neuro requesting continuous EEG, for which may need later advancement to ICU due to the nature of said testing.    DNR/DNI, family present while at [facility]. Expect to have advanced goals of care conversation, potential for comfort care discussion.

## 2024-02-03 NOTE — H&P
[facility]    History and Physical - Hospitalist Service       Date of Admission:  2/3/2024    Assessment & Plan      Juan Miguel Campbell is a 88M transfer from [facility] for acute CVA, encephalopathy; pmhx includes CAD (s/p CABG), dementia, HLD/HTN< GERD, hypothyroid, todds paralysis, failure to thrive; admitted for acute R MCA CVA, encephalopathy.    #R MCA CVA  #encephalopathy  No evidence to suggest herniation, has possibility for seizures? Had small R subdural fluid collection, unclear significance.  Onset 3pm 2/2. NIHSS unreliable due to minimal responsive/interaction.  -telemetry  -TTE, to be discussed with family (possible consideration of hospice vs. Comfort care?)  -CT head repeat  -CK--early rule out seizure activity  -A1c  -lipid panel  -troponin trend  -ASA 81mg PO every day  -atorvastatin 80mg PO every day  -neurology consulted  -PT/OT/SLP  -PMR consult  -Palliative consult, evaluate for hospice vs. Comfort care    #hyponatremia  #hypochloremia  Likely reduce intake.  -NS bolus x1  -BMP trend    #elevated Creatinine  Unclear baseline.  -BMP trend    #hyperglycemia  IN acute setting  -BMP trend    #leukocytosis  In acute setting.  -CBC trend    #HTN  -permissive HTN, SBP goals of <220    #HLD  -atorvastatin 80mg PO every day              Diet: NPO for Medical/Clinical Reasons Except for: Ice Chips, Meds  Combination Diet  DVT Prophylaxis: Pneumatic Compression Devices  St Catheter: Not present  Lines: None     Cardiac Monitoring: ACTIVE order. Indication: Stroke, acute (48 hours)  Code Status: No CPR- Do NOT Intubate    Clinically Significant Risk Factors Present on Admission               # Coagulation Defect: INR = 1.24 (Ref range: 0.85 - 1.15) and/or PTT = 37 Seconds (Ref range: 22 - 38 Seconds), will monitor for bleeding                    Disposition Plan      Expected Discharge Date: 02/05/2024                  Jonny Ulloa MD  Hospitalist Service  [facility]  [facility]  Securely message with [employer] (more info)  Text page via [employer] Paging/Directory     ______________________________________________________________________    Chief Complaint   Minimal responsiveness, weakness    History is obtained from the electronic health record and emergency department physician    History of Present Illness   Juan Miguel Campbell is a 88M transfer from [facility] for acute CVA, encephalopathy; pmhx includes CAD (s/p CABG), dementia, HLD/HTN< GERD, hypothyroid, todds paralysis, failure to thrive; admitted for acute R MCA CVA, encephalopathy.    Presented admitted to [facility] for increasing confusion, aspiration, falls.  At the time he was COVID-positive, however there was concern of possible new onset stroke.  During that evaluation there was no signs of acute stroke, however there were chronic changes identified.  He had been discharged to a transitional care unit on 1/30.  He was found by family and staff at approximately 3 PM on 2/2 with additional new left-sided weakness, reduced responsiveness.  He was brought in to [facility] and initial concern was for possible seizures; MRI was conducted and found a large right MCA distribution infarct.  Transfer was requested for dedicated neurological evaluation to include continuous EEG monitoring.    Post transfer evaluation Mr. Monteiro is resting comfortably in bed, no distress, not responding/reacting to noxious stimuli.  He is hemodynamically stable.      Past Medical History    No past medical history on file.    Past Surgical History   No past surgical history on file.    Prior to Admission Medications   None        Review of Systems    Review of systems not obtained due to patient factors - mental status     Physical Exam   Vital Signs: Temp: 99.1  F (37.3  C) Temp src: Axillary BP: (!) 157/74 Pulse: 85   Resp: 20 SpO2: 99 % O2 Device: Nasal cannula    Weight: 147 lbs 11.33 oz    Constitutional: resting comfortably, no  acute distress, no posturing, does not respond to voice or noxious stimuli  Respiratory: CTAB  Cardiovascular: irregularly irregular, 2/6 systolic murmur  GI: soft, nontender, nondistended  Neurologic: unresponsive to voice/noxious stimuli, PERRL.     Medical Decision Making       60 MINUTES SPENT BY ME on the date of service doing chart review, history, exam, documentation & further activities per the note.  MANAGEMENT DISCUSSED with the following over the past 24 hours: patient   NOTE(S)/MEDICAL RECORDS REVIEWED over the past 24 hours: ED notes, prior discharge summary, TCU note  Tests ORDERED & REVIEWED in the past 24 hours:  - See lab/imaging results included in the data section of the note      Data     I have personally reviewed the following data over the past 24 hrs:    16.9 (H)  \   13.8   / 358     133 (L) 96 (L) 22.9 /  135 (H)   4.5 25 1.29 (H) \     Trop: 14 BNP: N/A     INR:  1.24 (H) PTT:  37   D-dimer:  N/A Fibrinogen:  N/A       Imaging results reviewed over the past 24 hrs:   Recent Results (from the past 24 hour(s))   CT Head w/o Contrast    Narrative    EXAM: CT HEAD W/O CONTRAST  LOCATION: [facility]  DATE: 2/2/2024    INDICATION: Left-sided weakness  COMPARISON: None  TECHNIQUE: Routine CT Head without IV contrast. Multiplanar reformats. Dose reduction techniques were used.    FINDINGS:  INTRACRANIAL CONTENTS: Moderate to large area of acute/subacute ischemia in right MCA distribution with several small curvilinear hyperdensities throughout. These probably represent scattered intravascular thromboses; petechial hemorrhage cannot be   completely excluded. Mild chronic infarction left temporoparietal region and anterior aspect right basal ganglia. Chronic lacunar infarction left basal ganglia. Mild chronic infarction left occipital lobe. Moderate presumed chronic small vessel ischemic   changes. Moderate generalized volume loss. No hydrocephalus.     VISUALIZED  ORBITS/SINUSES/MASTOIDS: No intraorbital abnormality. Moderate mucosal thickening anterior aspect ethmoid air cells and frontal sinuses. Small amount of fluid right maxillary sinus. No middle ear or mastoid effusion.    BONES/SOFT TISSUES: No acute abnormality.      Impression    IMPRESSION:  1.  Moderate to large area of acute/subacute ischemia in right MCA distribution with several small curvilinear hyperdensities throughout. These probably represent scattered intravascular thromboses; petechial hemorrhage cannot be completely excluded.   2.  Chronic ischemic changes, as described.  3.  Moderate atrophy.    Results were called to Dr. XIAO at 2/2/2024 7:46 PM CST.   CTA Head Neck with Contrast    Narrative    EXAM: CTA HEAD NECK W CONTRAST  LOCATION: [facility]  DATE: 2/2/2024    INDICATION: Left-sided weakness  COMPARISON: None.  CONTRAST: 67mL Isovue 370  TECHNIQUE: Head and neck CT angiogram with IV contrast. Axial helical CT images of the head and neck vessels obtained during the arterial phase of intravenous contrast administration. Axial 2D reconstructed images and multiplanar 3D MIP reconstructed   images of the head and neck vessels were performed by the technologist. Dose reduction techniques were used. All stenosis measurements made according to NASCET criteria unless otherwise specified.    FINDINGS:   HEAD CTA:  ANTERIOR CIRCULATION: Marked narrowing proximal aspect one of the right M2 segments. Slight asymmetry in the appearance of MCA distributions with slightly less opacified branches on the right. Moderate atherosclerotic changes cavernous and supraclinoid   ICAs bilaterally. Short segment marked narrowing midportion of right A2. Standard [place] anatomy.    POSTERIOR CIRCULATION: Short segment moderate narrowing right P2. Dominant right and smaller left vertebral artery contribute to a normal basilar artery.     DURAL VENOUS SINUSES: Not well opacified to be  evaluated.    NECK CTA:  RIGHT CAROTID: Approximately 70% narrowing proximal right ICA. Correlation with carotid ultrasound recommended.    LEFT CAROTID: Approximately 50-60% narrowing.    VERTEBRAL ARTERIES: No focal stenosis or dissection. Dominant right and smaller left vertebral arteries.    AORTIC ARCH: Classic aortic arch anatomy with no significant stenosis at the origin of the great vessels.    NONVASCULAR STRUCTURES: Unremarkable.      Impression    IMPRESSION:     HEAD CTA:   1.  Marked narrowing proximal aspect one of the right M2 segments. Slight asymmetry in the appearance of MCA distributions with slightly less opacified branches on the right.   2.  Moderate atherosclerotic changes cavernous and supraclinoid ICAs bilaterally.   3.  Short segment marked narrowing midportion of right A2.  4.  Short segment moderate narrowing right P2.    NECK CTA:  1.  Approximately 70% narrowing proximal right ICA. Correlation with carotid ultrasound recommended.  2.  Approximately 50-60% narrowing proximal left ICA.  3.  No definite hemodynamically significant narrowing vertebral arteries.    Results were called to Dr. XIAO at 2/2/2024 7:46 PM CST.   MR Brain w/o & w Contrast    Narrative    EXAM: MR BRAIN W/O and W CONTRAST  LOCATION: [facility]  DATE: 2/2/2024    INDICATION: Stroke  COMPARISON:  Same day CTA head.  CONTRAST: 7 mL Gadavist  TECHNIQUE: Routine multiplanar multisequence head MRI without and with intravenous contrast.    FINDINGS:  INTRACRANIAL CONTENTS: Large subacute right MCA inferior division infarct involving the parietal lobe, temporal lobe, and insula with petechial hemorrhage predominantly in the temporal lobe. Mild local mass effect and sulcal effacement in the areas of   petechial hemorrhage. No edgar hematoma. No significant midline shift. Small subacute/chronic left corona radiata infarct. Chronic left MCA inferior division infarct involving the supramarginal gyrus.  Thin right convexity subdural collection. Moderate   generalized cerebral atrophy. No hydrocephalus. Normal position of the cerebellar tonsils. No pathologic contrast enhancement.    SELLA: No abnormality accounting for technique.    OSSEOUS STRUCTURES/SOFT TISSUES: Normal marrow signal. Intracranial vasculature better assessed on CTA.     ORBITS: Prior left cataract surgery. Visualized portions of the orbits are otherwise unremarkable.     SINUSES/MASTOIDS: Mild mucosal thickening scattered about the paranasal sinuses. No middle ear or mastoid effusion.       Impression    IMPRESSION:  1.  Large subacute right MCA inferior division infarct with petechial hemorrhage. No edgar hematoma or midline shift.  2.  Small subacute/chronic left corona radiata infarct in a region of chronic microvascular ischemic change.  3.  Chronic left MCA inferior division infarct involving the supramarginal gyrus.  4.  Thin right convexity subdural collection.

## 2024-02-03 NOTE — PROGRESS NOTES
Speech Therapy: Orders received. Chart reviewed and discussed with care team.? Speech Therapy not indicated due to change in medical status - pt on comfort cares.? Defer discharge recommendations to Care Team.? Will complete orders.

## 2024-02-03 NOTE — ED TRIAGE NOTES
SAMANTHA from [facility] for stroke like symptoms. Patient unarousable and has left sided weakness according to staff. LKW 1500 when wife was present. Recent hospitalization for stroke like symptoms and right sided weakness; residual right sided weakness. Patient arousing to pain now.     Triage Assessment (Adult)       Row Name 02/02/24 4214          Triage Assessment    Airway WDL WDL        Respiratory WDL    Respiratory WDL WDL        Skin Circulation/Temperature WDL    Skin Circulation/Temperature WDL WDL        Cardiac WDL    Cardiac WDL WDL        Peripheral/Neurovascular WDL    Peripheral Neurovascular WDL WDL        Cognitive/Neuro/Behavioral WDL    Cognitive/Neuro/Behavioral WDL X;arousability;level of consciousness;motor response     Level of Consciousness obtunded     Arousal Level arouses to pain     Orientation other (see comments)  MARILYN     Speech unable to speak        [place] Coma Scale    Best Eye Response 2-->(E2) to pain     Best Motor Response 3-->(M3) flexion to pain     Best Verbal Response 1-->(V1) none     Dru Coma Scale Score 6        Motor Response    Motor Response general motor response     General Motor Response withdraws

## 2024-02-03 NOTE — PLAN OF CARE
Goal Outcome Evaluation:         Problem: Adult Inpatient Plan of Care  Goal: Optimal Comfort and Wellbeing  Outcome: Progressing     Problem: Palliative Care  Goal: Enhanced Quality of Life  Outcome: Progressing       Problem: Stroke, Ischemic (Includes Transient Ischemic Attack)  Goal: Optimal Functional Ability  Outcome: Progressing  Intervention: Optimize Functional Ability  Recent Flowsheet Documentation  Taken 2/3/2024 1145 by Susy Min RN  Activity Management: bedrest  Taken 2/3/2024 0830 by Susy Min RN  Activity Management: bedrest       Pt is obtunded, will arouse to some stimuli, NIHSS 25.  No signs of pain.  Lung sounds diminished, sating 99% on O2 2 liters NC.  Blood glucose 124 and 146.  Pt unable to take PO intake, turned and repositioned every 2 hours.  Order placed for comfort care today.    Susy Min, RN

## 2024-02-03 NOTE — CONSULTS
Care Management Initial Consult    General Information  Assessment completed with: Children, Dtr, Melvina  Type of CM/SW Visit: Initial Assessment    Primary Care Provider verified and updated as needed:     Readmission within the last 30 days: current reason for admission unrelated to previous admission   Return Category: New Diagnosis  Reason for Consult: discharge planning, end of life/hospice  Advance Care Planning:            Communication Assessment  Patient's communication style:               Cognitive  Cognitive/Neuro/Behavioral: arousability, level of consciousness  Level of Consciousness: obtunded  Arousal Level: arouses to voice           Speech: incoherent    Living Environment:   People in home: child(chavo), adult, spouse     Current living Arrangements: house      Able to return to prior arrangements: yes       Family/Social Support:  Care provided by: child(chavo)  Provides care for: no one, unable/limited ability to care for self  Marital Status:   Wife, Children          Description of Support System: Supportive, Involved    Support Assessment: Adequate family and caregiver support, Adequate social supports    Current Resources:   Patient receiving home care services: No     Community Resources: None  Equipment currently used at home:    Supplies currently used at home:      Employment/Financial:  Employment Status: retired     Employment/ Comments: Is a   Financial Concerns: insurance inadequate (have applied for MA)   Referral to Financial Worker: Yes       Does the patient's insurance plan have a 3 day qualifying hospital stay waiver? unknown    Lifestyle & Psychosocial Needs:  Social Determinants of Health     Food Insecurity: Not on file   Depression: Not on file   Housing Stability: Not on file   Tobacco Use: Not on file   Financial Resource Strain: Not on file   Alcohol Use: Not on file   Transportation Needs: Not on file   Physical Activity: Not on file   Interpersonal  Safety: Not on file   Stress: Not on file   Social Connections: Not on file       Functional Status:  Prior to admission patient needed assistance:   Dependent ADLs:: Ambulation-walker, Toileting, Bathing, Dressing, Grooming, Transfers  Dependent IADLs:: Cleaning, Cooking, Laundry, Shopping, Meal Preparation, Money Management, Medication Management, Transportation             Additional Information:  SW met with pts daughter, Melvina, and wife outside pt room. Melvina provides most of the information. She reports that pt and wife moved in with her and her  last August and pt has had a decline since that time. She was his full-time caregiver at home. She reports he has had multiple strokes. She confirms he was in the hospital at [facility] and then at [facility] before being readmitted. She reports that they are in the process of applying for MA-LTC and elderly waiver for the pt and wife with a goal of YULIA. She reports they applied through [facility], worker: Jessie Gregg, phone- [phone] and case #- [id number]. She denies any in home care prior to pts first hospital admission but reports she was providing all cares as pt had increased needs.    Discussed conversation with provider today about possible comfort cares and hospice. Discussed that pt can be comfort cares at the hospital. Discussed that if pt is able to discharge from the hospital that we will get him set up with a hospice agency to provide hospice services. Education provided on hospice care in the community including that the room and board at a facility would not be covered by Medicare. Melvina reports understanding. She reports that bringing pt home on hospice will not be an option for their family. She reports that private pay for a facility is also not an option. Discussed plan to request financial counselor check on status of MA application on Monday. Melvina does report pt is a  but has not been connected too much with  services ([facility] [place] and Melvina applied for services for him through the [facility] office in [facility]). Discussed that SW can check with the [facility] nursing home coordinator and that pt may be able to get coverage for nursing home care at a VA contracted facility while on hospice. Melvina reports understanding. SW provided list of hospice agencies and list of VA contracted facilities for Melvina to review.    SW placed call to [facility] Nursing Home Coordinator, Cece ([phone]) and left message requesting call back about pts ability to use the contracted nursing home benefit while on hospice. SW sent inbasket message to financial counseling. At this time anticipate that SW will follow for pts progression of care and will readdress possible discharge when more information is known about pts ability to discharge and options for coverage on Monday.     Naya Logan, [facility]    Addendum: SW revisited with pts daughter, Melvina, her  Fred and pts wife. Provided brief information about what was discussed above and SW plan at this time. They report understanding and deny other questions.  1:17 PM

## 2024-02-03 NOTE — PROGRESS NOTES
[facility]    Medicine Progress Note - Hospitalist Service    Date of Admission:  2/3/2024    Assessment & Plan      Juan Miguel Campbell is a 88M transfer from [facility] for acute/subacte CVA, encephalopathy; pmhx includes CAD (s/p CABG), known previous CVA's, dementia, recent covid infection  admitted for acute R MCA CVA, encephalopathy.    #R MCA CVA  #encephalopathy  No evidence to suggest herniation, has possibility for seizures? Had small R subdural fluid collection, unclear significance.  --- Unclear when he was last known well  --- Reviewed stroke neurology notes  ----Note; daughter gives very clear history of patient declining over the past fall since they moved up with her and her .  Increasingly weak with falls.  Unclear significance of COVID positivity as he had some of the symptoms when he was hospitalized last week for 8 days.  Has been becoming less and less responsive and goals of care are consistent with hospice care at this time.  Given lack of responsiveness appears appropriate for comfort care here in the hospital and discussed consulting hospice, lack of IV fluids, transitioning from restorative to comfort based approach.  Daughter in full agreement.  --- Stop the following appropriate stroke measures due to transition to comfort care.  -telemetry  -TTE, -CT head repeat  -CK  -A1c  -lipid panel  -troponin trend  -ASA 81mg PO every day  -atorvastatin 80mg PO every day  -neurology consult appreciated.  Understand patient will be going on comfort care  -PT/OT/SLP  -Palliative consulted but as it is weekend with clear goals of care will discontinue    Seizure disorder  --- Initial concern for seizures.  Patient is on Keppra  --- Discussed with neurology.  Given concern for seizures affecting end-of-life care will attempt to Keppra sublingual to transition off of IV.  May just need Ativan as needed    #hyponatremia  #hypochloremia  #hypoerglycemia  Likely reduce intake.  -NS  bolus x1  -stop trending due to comfort care    #elevated Creatinine  Unclear baseline.  -stop trending due to comfort care    #leukocytosis    #HTN  -permissive HTN, SBP goals of <220    #HLD  -stop atorvastatin 80mg PO every day    Plan right now is discharged to hospice or nursing home facility with hospice services versus unclear if he will die here in the hospital.  On comfort care.  Family in agreement          Diet: NPO for Medical/Clinical Reasons Except for: Ice Chips, Meds  Combination Diet    DVT Prophylaxis: stop as on comfort care  Ts Catheter: Not present  Lines: None     Cardiac Monitoring: ACTIVE order. Indication: Stroke, acute (48 hours)  Code Status: No CPR- Do NOT Intubate      Clinically Significant Risk Factors Present on Admission          # Hypocalcemia: Lowest Ca = 8.3 mg/dL in last 2 days, will monitor and replace as appropriate     # Hypoalbuminemia: Lowest albumin = 3.3 g/dL at 2/3/2024  6:58 AM, will monitor as appropriate    # Coagulation Defect: INR = 1.24 (Ref range: 0.85 - 1.15) and/or PTT = 37 Seconds (Ref range: 22 - 38 Seconds), will monitor for bleeding  # Drug Induced Platelet Defect: home medication list includes an antiplatelet medication            # Financial/Environmental Concerns: insurance inadequate (have applied for MA)         Disposition Plan      Expected Discharge Date: 02/05/2024      Destination: nursing home;long-term care facility              Smiley Rogers MD  Hospitalist Service  [facility]  Securely message with [employer] (more info)  Text page via [employer] Paging/Directory   ______________________________________________________________________    Interval History   --- Patient seen and chart reviewed.  Extensive conversations this morning with patient, nurse, patient's daughter, social work, and eventually neurology as well.  --- Fairly unresponsive overnight.  No seizure-like activity noted.  --- Daughter confirms he speaks fluent  Malagasy and English  --- Daughter confirms decline over the past year.  She suspects has had dementia for years but was formally diagnosed about a year ago.  Strokes known.  Fairly significant subacute decline over the past month with weakness and increase in behavioral changes.  --- Have been open to hospice for a while and feel he is overall just declining, even prior to recent hospitalizations this month.    Physical Exam   Vital Signs: Temp: (!) 100.5  F (38.1  C) Temp src: Axillary BP: 136/77 Pulse: 89   Resp: 20 SpO2: 99 % O2 Device: Nasal cannula    Weight: 147 lbs 11.33 oz    General Appearance: Frail.  Turns his head towards me when I walked in the door but no clear interactions.  Respiratory: Mild rasping bilaterally  Cardiovascular: Sounds regular  GI: Soft no tenderness no masses.  Skin: No significant open areas.  Dry skin scattered senile purpura  Other: Is able to grasp my finger with his right hand and hold up with me.  Left arm fairly flaccid.  Some withdrawing to pain on the right no withdrawal to pain on the left.  No tremors or seizure-like activity that I can appreciate    Medical Decision Making       70 MINUTES SPENT BY ME on the date of service doing chart review, history, exam, documentation & further activities per the note.      Data     I have personally reviewed the following data over the past 24 hrs:    17.2 (H)  \   13.2 (L)   / 332     136 102 22.7 /  146 (H)   4.0 26 1.24 (H) \     ALT: 41 AST: 38 AP: 161 (H) TBILI: 0.6   ALB: 3.3 (L) TOT PROTEIN: 7.1 LIPASE: N/A     Trop: 17 BNP: N/A     TSH: N/A T4: N/A A1C: 6.4 (H)     INR:  1.24 (H) PTT:  37   D-dimer:  N/A Fibrinogen:  N/A       Imaging results reviewed over the past 24 hrs:   Recent Results (from the past 24 hour(s))   CT Head w/o Contrast    Narrative    EXAM: CT HEAD W/O CONTRAST  LOCATION: [facility]  DATE: 2/2/2024    INDICATION: Left-sided weakness  COMPARISON: None  TECHNIQUE: Routine CT Head  without IV contrast. Multiplanar reformats. Dose reduction techniques were used.    FINDINGS:  INTRACRANIAL CONTENTS: Moderate to large area of acute/subacute ischemia in right MCA distribution with several small curvilinear hyperdensities throughout. These probably represent scattered intravascular thromboses; petechial hemorrhage cannot be   completely excluded. Mild chronic infarction left temporoparietal region and anterior aspect right basal ganglia. Chronic lacunar infarction left basal ganglia. Mild chronic infarction left occipital lobe. Moderate presumed chronic small vessel ischemic   changes. Moderate generalized volume loss. No hydrocephalus.     VISUALIZED ORBITS/SINUSES/MASTOIDS: No intraorbital abnormality. Moderate mucosal thickening anterior aspect ethmoid air cells and frontal sinuses. Small amount of fluid right maxillary sinus. No middle ear or mastoid effusion.    BONES/SOFT TISSUES: No acute abnormality.      Impression    IMPRESSION:  1.  Moderate to large area of acute/subacute ischemia in right MCA distribution with several small curvilinear hyperdensities throughout. These probably represent scattered intravascular thromboses; petechial hemorrhage cannot be completely excluded.   2.  Chronic ischemic changes, as described.  3.  Moderate atrophy.    Results were called to Dr. XIAO at 2/2/2024 7:46 PM CST.   CTA Head Neck with Contrast    Narrative    EXAM: CTA HEAD NECK W CONTRAST  LOCATION: [facility]  DATE: 2/2/2024    INDICATION: Left-sided weakness  COMPARISON: None.  CONTRAST: 67mL Isovue 370  TECHNIQUE: Head and neck CT angiogram with IV contrast. Axial helical CT images of the head and neck vessels obtained during the arterial phase of intravenous contrast administration. Axial 2D reconstructed images and multiplanar 3D MIP reconstructed   images of the head and neck vessels were performed by the technologist. Dose reduction techniques were used. All  stenosis measurements made according to NASCET criteria unless otherwise specified.    FINDINGS:   HEAD CTA:  ANTERIOR CIRCULATION: Marked narrowing proximal aspect one of the right M2 segments. Slight asymmetry in the appearance of MCA distributions with slightly less opacified branches on the right. Moderate atherosclerotic changes cavernous and supraclinoid   ICAs bilaterally. Short segment marked narrowing midportion of right A2. Standard [place] of [place] anatomy.    POSTERIOR CIRCULATION: Short segment moderate narrowing right P2. Dominant right and smaller left vertebral artery contribute to a normal basilar artery.     DURAL VENOUS SINUSES: Not well opacified to be evaluated.    NECK CTA:  RIGHT CAROTID: Approximately 70% narrowing proximal right ICA. Correlation with carotid ultrasound recommended.    LEFT CAROTID: Approximately 50-60% narrowing.    VERTEBRAL ARTERIES: No focal stenosis or dissection. Dominant right and smaller left vertebral arteries.    AORTIC ARCH: Classic aortic arch anatomy with no significant stenosis at the origin of the great vessels.    NONVASCULAR STRUCTURES: Unremarkable.      Impression    IMPRESSION:     HEAD CTA:   1.  Marked narrowing proximal aspect one of the right M2 segments. Slight asymmetry in the appearance of MCA distributions with slightly less opacified branches on the right.   2.  Moderate atherosclerotic changes cavernous and supraclinoid ICAs bilaterally.   3.  Short segment marked narrowing midportion of right A2.  4.  Short segment moderate narrowing right P2.    NECK CTA:  1.  Approximately 70% narrowing proximal right ICA. Correlation with carotid ultrasound recommended.  2.  Approximately 50-60% narrowing proximal left ICA.  3.  No definite hemodynamically significant narrowing vertebral arteries.    Results were called to Dr. XIAO at 2/2/2024 7:46 PM CST.   MR Brain w/o & w Contrast    Narrative    EXAM: MR BRAIN W/O and W CONTRAST  LOCATION: [facility]  DATE: 2/2/2024    INDICATION: Stroke  COMPARISON:  Same day CTA head.  CONTRAST: 7 mL Gadavist  TECHNIQUE: Routine multiplanar multisequence head MRI without and with intravenous contrast.    FINDINGS:  INTRACRANIAL CONTENTS: Large subacute right MCA inferior division infarct involving the parietal lobe, temporal lobe, and insula with petechial hemorrhage predominantly in the temporal lobe. Mild local mass effect and sulcal effacement in the areas of   petechial hemorrhage. No edgar hematoma. No significant midline shift. Small subacute/chronic left corona radiata infarct. Chronic left MCA inferior division infarct involving the supramarginal gyrus. Thin right convexity subdural collection. Moderate   generalized cerebral atrophy. No hydrocephalus. Normal position of the cerebellar tonsils. No pathologic contrast enhancement.    SELLA: No abnormality accounting for technique.    OSSEOUS STRUCTURES/SOFT TISSUES: Normal marrow signal. Intracranial vasculature better assessed on CTA.     ORBITS: Prior left cataract surgery. Visualized portions of the orbits are otherwise unremarkable.     SINUSES/MASTOIDS: Mild mucosal thickening scattered about the paranasal sinuses. No middle ear or mastoid effusion.       Impression    IMPRESSION:  1.  Large subacute right MCA inferior division infarct with petechial hemorrhage. No edgar hematoma or midline shift.  2.  Small subacute/chronic left corona radiata infarct in a region of chronic microvascular ischemic change.  3.  Chronic left MCA inferior division infarct involving the supramarginal gyrus.  4.  Thin right convexity subdural collection.   CT Head w/o Contrast    Narrative    EXAM: CT HEAD W/O CONTRAST  LOCATION: [facility]  DATE: 2/3/2024    INDICATION: acute CVA, evaluate progression of intracranial hemorrhage  R subdural fluid on prior  COMPARISON: MRI brain 02/02/2024  TECHNIQUE: Routine CT Head without IV contrast.  Multiplanar reformats. Dose reduction techniques were used.    FINDINGS:  INTRACRANIAL CONTENTS: Right MCA territory infarct with low-attenuation changes throughout the right frontal/parietal/temporal lobe junction remain stable compared to previous examination with subtle areas of high attenuation along the cortical margins   of the infarct remains stable compared to prior examination considering differences in technique (CTA versus MRA) segment. The degree of right to left midline shift and mass effect upon the right lateral ventricle approximately 3 mm is unchanged.    Thin low-attenuation subdural fluid collection overlying the right cerebral convexity measuring up to 3 mm unchanged when compared to MRI scan.    Subacute infarct within the left corona radiata unchanged.    Multifocal chronic infarcts including subcortical infarcts involving the right frontal lobe, posterior left frontal/parietal lobe junction, and left occipital lobe remain unchanged.     Nasopharynx is clear. Cerebellar hemispheres, fourth ventricle and region of CP angle cisterns are clear. Region of the sella and suprasellar cistern are clear.        Impression    IMPRESSION:  1.  Stable acute/subacute right MCA territory infarct with low-attenuation changes/gyral edema and areas of petechial hemorrhage involving the right frontal/parietal/temporal lobe infarct. Infarct territory and mass effect remain stable compared to MRI   scan of 02/02/2024 with right to left shift of 3 mm.    2.  Thin low-attenuation subdural fluid collection overlying the right cerebral convexity measuring up to 3 mm remains unchanged.    3.  Small acute/subacute left corona radiata infarct unchanged.    4.  Multifocal chronic infarcts elsewhere in the deep white matter both cerebral hemispheres.

## 2024-02-03 NOTE — PHARMACY-ADMISSION MEDICATION HISTORY
Pharmacist Admission Medication History    Admission medication history is complete. The information provided in this note is only as accurate as the sources available at the time of the update.    Information Source(s): Hospital records and Facility ([place] [facility]/) medication list/MAR via phone ([facility] - [phone])    Pertinent Information: Obtained list from U med list and confirmed last doses with RN at [place] [facility] via phone. Consent to view [employer] information not obtained at time of encounter.     Changes made to PTA medication list:  Added: All  Deleted: None  Changed: None    Allergies reviewed with patient and updates made in EHR: unable to assess    Medication History Completed By: Christie Black [facility] 2/3/2024 7:46 AM    Prior to Admission medications    Medication Sig Last Dose Taking? Auth Provider Long Term End Date   acetaminophen (TYLENOL) 500 MG tablet Take 500-1,000 mg by mouth 3 times daily 2/2/2024 at AM Yes Unknown, Entered By History     acetaminophen (TYLENOL) 500 MG tablet Take 500-1,000 mg by mouth 2 times daily as needed for pain (Arthritis) Unknown at PRN Yes Unknown, Entered By History     clopidogrel (PLAVIX) 75 MG tablet Take 75 mg by mouth every evening 2/1/2024 at PM Yes Unknown, Entered By History     INULIN PO Take 1 chew tab by mouth daily 2/2/2024 at AM Yes Unknown, Entered By History     levETIRAcetam (KEPPRA) 500 MG tablet Take 500 mg by mouth 2 times daily 2/1/2024 at PM Yes Unknown, Entered By History     levothyroxine (SYNTHROID/LEVOTHROID) 50 MCG tablet Take 50 mcg by mouth daily before breakfast 2/2/2024 at AM Yes Unknown, Entered By History     memantine (NAMENDA) 10 MG tablet Take 10 mg by mouth 2 times daily 2/2/2024 at AM Yes Unknown, Entered By History     omeprazole (PRILOSEC) 20 MG DR capsule Take 20 mg by mouth daily as needed (GI upset) Unknown at PRN Yes Unknown, Entered By History     polyethylene glycol (MIRALAX) 17 GM/Dose powder Take 1 Scoop  by mouth daily as needed for constipation Unknown at PRN Yes Unknown, Entered By History     rosuvastatin (CRESTOR) 40 MG tablet Take 40 mg by mouth at bedtime 2/1/2024 at HS Yes Unknown, Entered By History     senna (SENOKOT) 8.6 MG tablet Take 8.6 mg by mouth 2 times daily 2/2/2024 at AM Yes Unknown, Entered By History

## 2024-02-03 NOTE — CONSULTS
[facility]    Stroke Telephone Note    I was called by  on 2/2/24 regarding patient Juan Miguel Campbell. The patient is a 88 year old man on whom we did not have a lot of details on except that he had a recent stroke with R sided deficits and was discharged on January 31st to a transitional facility - later when family came in, they were only aware of him having COVID when he was hospitalized January and was unaware of a recent stroke history. They report that he had a stroke in Sept 2023 wherein presenting symptom was increased confusion    LKW was around 15:00 on 2/2 when he went to bed for a nap. Staff noted later in the vening that he was hard to wake up and extremely somnolent so EMS was called who noted Left sided weakness. At ED, he is somnolent, aroused by noxious stim or loud voice, not moving the left side but able to follow a few commands with the right side. 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 /83    Of note: family who came in later reports that patient has not been acting like himself for the last couple of days and at this time LKW is unclear.    Vitals  BP: (!) 146/73   Pulse: 79   Resp: 16   Temp: 100.2  F (37.9  C)   Weight: 67.1 kg (148 lb)    Stroke Code Data (for stroke code without tele)  Stroke code activated 02/02/24 1920   Stroke provider first response 02/02/24  1925   Last known normal 02/02/24  1500      Time of discovery (or onset of symptoms) 02/02/24  1800   Head CT read by Stroke Neuro Provider 02/02/24  1940   Was stroke code de-escalated? Yes  02/02/24 1956     Imaging Findings  CT head: Moderate to large area of acute/subacute ischemia in right MCA distribution with several small curvilinear hyperdensities throughout ?petechial hemorrhages. Chronic infarction left temporoparietal region and anterior aspect right basal ganglia. Chronic lacunar infarction left basal ganglia. Mild chronic infarction left occipital lobe.  "    HEAD CTA:   1.  R M2 severe stenosis. Slight asymmetry in the appearance of MCA distributions with slightly less opacified branches on the right.   2.  Moderate atherosclerotic changes cavernous and supraclinoid ICAs bilaterally.   3.  Short segment marked narrowing midportion of right A2.  4.  Short segment moderate narrowing right P2.     NECK CTA:  1.  Approximately 70% narrowing proximal right ICA. Correlation with carotid ultrasound recommended.  2.  Approximately 50-60% narrowing proximal left ICA.    Intravenous Thrombolysis  Not given due to:   - recent stroke    Endovascular Treatment  Not initiated due to absence of proximal vessel occlusion    Impression  Subacute R MCA stroke  Altered mental status    Concern for possible breakthrough seizure with Emanuel's palsy vs another stroke. Imaging does not match with history provided to us; he was most likely admitted for R MCA stroke w residual left sided weakness. Currently is on Keppra 750 mg BID. Continues to be somnolent; possible that he could be having sub clinical seizures    Recommendations   - Load with Keppra 3 g; start on maintenance 1.5 g BID  - Transfer to hospital with cEEG   - MRI brain w/wo contrast to rule out acute stroke. Continue on PTA Plavix.  - Maintain eunatremia, eumagnesemia, eucalcemia and eugluycemia  - Rule out toxic/metabolic/infectious etiologies for possibly lowering seizure threshold    UPDATE: MRI brain today w e/o subacute strokes in R MCA territorry with petechial hemorrhage; thin right convexity subdural collection  . Notes from admission in late January have now been accessed and do not indicate that he had a stroke but no MRI was done during that visit - only CT was done which did not show any acute stroke. Excrept from Discharge summary on 1/30\"..He was from [place] and was recently moved because of the mother not able to provide the care for the father.  Daughter had some sinus congestion and upper airway infection 3 " "weeks ago but patient does not go outside and tested positive for COVID.  Lately family has noticed that he has been having runny nose, coughing with eating especially both liquids and solids.  Decreased oral intake, decreased appetite, decreased interest to do anything, notices some right-sided weakness and also right-sided drooling while eating, poor water intake and also decreased urine output.  Family cannot provide the level of care according to the daughter and requesting palliative input.  They are also considering hospice only if he has multiple strokes.  They want to look at long-term care and we discussed about starting with MRI to see if he had any new strokes and straining speech and swallow before moving to hospice at this time.  Family noticed that patient has been sleeping 18 hours a day.  Requiring 2 people to move around the house. In the emergency department patient had a positive COVID but no signs of dehydration noted.  Had a CT of the head which showed old strokes but no new strokes, chest x-ray which was unremarkable.  Patient is not hypoxic.  Patient was complaining of right-sided weakness and limited mobility of the right leg...\"    Given the above history, and unclear LKW as well as large subacute right MCA inferior division infarct with petechial hemorrhage, patient has suffered a stroke in the interim between discharge on 1/30 and presentation to the ED today on 2/2.   - Would treat as subacute stroke as reason for left dense hemiparesis and complete basic stroke work up  - cEEG can be done to rule out seizure activity.   - Lastly given 70% narrowing of proximal R ICA might benefit from Vascular surgery consultation for possible intervention.  - Repeat CT Head in 4-6 hours given SDH, before resuming Plavix    My recommendations are based on the information provided over the phone by Juan Miguel Campbell's in-person providers. They are not intended to replace the clinical judgment of his " "in-person providers. I was not requested to personally see or examine the patient at this time.     The Stroke Staff is Dr. Cazares.    Shree Aranda MD  Vascular Neurology Fellow    To page me or covering stroke neurology team member, click here: [facility]COM  Choose \"On Call\" tab at top, then select \"NEUROLOGY/ALL SITES\" from middle drop-down box, press Enter, then look for \"stroke\" or \"telestroke\" for your site.   "

## 2024-02-03 NOTE — PHARMACY
Added allergies from care center([facility]) list, did not list reactions.  Harjeet Garcia RPH on 2/3/2024 at 9:10 AM

## 2024-02-03 NOTE — ED PROVIDER NOTES
"    History     Chief Complaint:  Stroke Symptoms       HPI   Juan Miguel Campbell is a 88 year old  male arrives via EMS from transitional care facility for apparent new onset altered mental status and left-sided weakness.  He is DNR/DNI.  History is limited at this time.  According to the paramedics, he was in his usual state of health at around 3 PM which is alert and oriented.  Shortly before arrival, he was altered and minimally arousable.  It was reported that he was recently hospitalized with right-sided weakness and discharged to the transitional care facility on January 31.    Additional medical history according to his paperwork includes dementia the severity is unknown.  Coronary artery disease, history of apparent epilepsy with Emanuel's paralysis, dysarthria and dysphagia.    He is not anticoagulated but is on Plavix.      Independent Historian:    Daughter is present and is the main historian.  HPI is obtained through discussions with her, paramedics, and review of the EMR.    Review of External Notes:  I reviewed the discharge summary from [facility] dated January 30, 2024, Dr. Romo.    This discusses his hospitalization in September and notes that he had a history of Emanuel's paralysis there requiring Keppra initiation.    He was hospitalized last month was found to have COVID.  Neuroimaging showed no acute findings and he was ultimately discharged to a transitional care facility 2 days ago.    Medications:    No current outpatient medications on file.      Past Medical History:    No past medical history on file.    Past Surgical History:    No past surgical history on file.       Physical Exam   Patient Vitals for the past 24 hrs:   BP Temp Temp src Pulse Resp SpO2 Height Weight   02/02/24 2044 -- -- -- 81 -- 97 % -- --   02/02/24 2030 (!) 145/77 -- -- 80 -- 97 % -- --   02/02/24 2025 -- -- -- -- -- -- 1.651 m (5' 5\") 67.1 kg (148 lb)   02/02/24 2015 (!) 141/69 -- -- 84 -- 98 % -- --   02/02/24 " 2010 -- -- -- 81 -- 98 % -- --   02/02/24 2000 138/69 -- -- 85 -- -- -- --   02/02/24 1948 (!) 144/73 -- -- 94 -- 98 % -- --   02/02/24 1945 -- 100.2  F (37.9  C) Axillary 94 -- 99 % -- --   02/02/24 1944 (!) 143/78 -- -- 94 -- -- -- --   02/02/24 1922 -- -- -- -- -- 94 % -- --   02/02/24 1910 121/83 -- -- 105 16 (!) 89 % -- --        Physical Exam  General: Obtunded.  Eventually responds to noxious stimuli.  HENT: No external sign of trauma.  He appears to have some left-sided facial weakness.  There is no oral or pharyngeal trauma seen.  Eyes: EOMI. Normal conjunctiva.  Neck:  Normal range of motion and appearance.   Cardiovascular:  Normal rate.   Pulmonary/Chest:  Effort normal.  Abdominal: Soft. No distension or tenderness.     Musculoskeletal:   Neurological:   Skin: Warm and dry. No rash or bruising.         Emergency Department Course     ECG results from 02/02/24   EKG 12-lead, tracing only     Value    Systolic Blood Pressure     Diastolic Blood Pressure     Ventricular Rate 102    Atrial Rate 102    OR Interval 150    QRS Duration 78        QTc 461    P Axis -13    R AXIS 49    T Axis 100    Interpretation ECG      Sinus tachycardia  Anteroseptal infarct , age undetermined  Abnormal ECG  No previous ECGs available         Imaging:  MR Brain w/o & w Contrast   Final Result   IMPRESSION:   1.  Large subacute right MCA inferior division infarct with petechial hemorrhage. No edgar hematoma or midline shift.   2.  Small subacute/chronic left corona radiata infarct in a region of chronic microvascular ischemic change.   3.  Chronic left MCA inferior division infarct involving the supramarginal gyrus.   4.  Thin right convexity subdural collection.      CTA Head Neck with Contrast   Final Result   IMPRESSION:       HEAD CTA:    1.  Marked narrowing proximal aspect one of the right M2 segments. Slight asymmetry in the appearance of MCA distributions with slightly less opacified branches on the right.    2.   Moderate atherosclerotic changes cavernous and supraclinoid ICAs bilaterally.    3.  Short segment marked narrowing midportion of right A2.   4.  Short segment moderate narrowing right P2.      NECK CTA:   1.  Approximately 70% narrowing proximal right ICA. Correlation with carotid ultrasound recommended.   2.  Approximately 50-60% narrowing proximal left ICA.   3.  No definite hemodynamically significant narrowing vertebral arteries.      Results were called to Dr. XIAO at 2/2/2024 7:46 PM CST.      CT Head w/o Contrast   Final Result   IMPRESSION:   1.  Moderate to large area of acute/subacute ischemia in right MCA distribution with several small curvilinear hyperdensities throughout. These probably represent scattered intravascular thromboses; petechial hemorrhage cannot be completely excluded.    2.  Chronic ischemic changes, as described.   3.  Moderate atrophy.      Results were called to Dr. XIAO at 2/2/2024 7:46 PM CST.        Report per radiology    Laboratory:  Labs Ordered and Resulted from Time of ED Arrival to Time of ED Departure   BASIC METABOLIC PANEL - Abnormal       Result Value    Sodium 133 (*)     Potassium 4.5      Chloride 96 (*)     Carbon Dioxide (CO2) 25      Anion Gap 12      Urea Nitrogen 22.9      Creatinine 1.29 (*)     GFR Estimate 54 (*)     Calcium 8.9      Glucose 135 (*)    INR - Abnormal    INR 1.24 (*)    CBC WITH PLATELETS AND DIFFERENTIAL - Abnormal    WBC Count 16.9 (*)     RBC Count 4.70      Hemoglobin 13.8      Hematocrit 41.3      MCV 88      MCH 29.4      MCHC 33.4      RDW 12.7      Platelet Count 358      % Neutrophils 79      % Lymphocytes 12      % Monocytes 8      % Eosinophils 0      % Basophils 0      % Immature Granulocytes 1      NRBCs per 100 WBC 0      Absolute Neutrophils 13.3 (*)     Absolute Lymphocytes 2.1      Absolute Monocytes 1.4 (*)     Absolute Eosinophils 0.0      Absolute Basophils 0.1      Absolute Immature Granulocytes 0.1      Absolute  NRBCs 0.0     GLUCOSE BY METER - Abnormal    GLUCOSE BY METER POCT 133 (*)    PARTIAL THROMBOPLASTIN TIME - Normal    aPTT 37     TROPONIN T, HIGH SENSITIVITY - Normal    Troponin T, High Sensitivity 14     GLUCOSE MONITOR NURSING POCT            Emergency Department Course & Assessments:    After initial assessment, called tier 1 code stroke, approximately 7:20 PM.  No information is currently available in the EMR or Care Everywhere.  Spoke with heart 2 troubleshoot this situation.    7:30 PM: Spoke with stroke neurologist.  Patient is currently at imaging.  Able to confirm in TCU paperwork that patient does have a POLST stipulating DNR/DNI status.    8:05 PM have conferred back-and-forth with stroke neurologist regarding imaging results and recommendations.  They have recommended a load with high-dose IV Keppra given patient's history of Emanuel's paralysis and current symptoms.  CT findings demonstrate a subacute large right-sided infarct.  They have recommended transfer to a facility that has continuous EEG monitoring capabilities.  Efforts are underway to that end.  I have spoken with the family regarding results and plan.    Recent past medical history remains unclear as we are unable to access records from [facility] per family from January 22 through January 31.  They do note that he was diagnosed with COVID at that time.  They are unaware of any neuroimaging that may or may not have been performed.    9:00 I have learned that there are no available beds at [facility] or [facility].  The only in system option is [facility].  I have spoken with the family about this and him reaching out to a provider at that facility.    930 I spoke with Dr. David hospitalist at [facility] regarding patient's presentation workup and need for transfer.  He does except in plans or transfer once a bed is been assigned.    Patient is currently off getting an MRI which had  been initially recommended by the stroke neurologist, before it was clear patient would require transfer.  This was not discontinued by myself and is now being undertaken.        Interventions:  Medications   CT Scan Flush (80 mLs Intravenous $Given 2/2/24 1927)   iopamidol (ISOVUE-370) solution 500 mL (67 mLs Intravenous $Given 2/2/24 1927)   levETIRAcetam (KEPPRA) 3,000 mg in sodium chloride 0.9 % 250 mL intermittent infusion (3,000 mg Intravenous $New Bag 2/2/24 2130)   gadobutrol (GADAVIST) injection 7 mL (65 mLs Intravenous $Given 2/2/24 2050)       Independent Interpretation (X-rays, CTs, rhythm strip):  I reviewed the noncontrast head CT and saw no acute intracranial hemorrhage.    Consultations/Discussion of Management or Tests:  Stroke neurology.  Also discussed imaging results with neuroradiology.  Eventually spoke with the hospitalist at [facility].          Disposition:  The patient was transferred to [facility] via EMS. Dr. Curry accepted the patient for transfer.     Impression & Plan    CMS Diagnoses: The patient has stroke symptoms:         ED Stroke specific documentation           NIHSS PDF     Patient last known well time: Uncertain.  I was initially told 3 PM, but family later reports that he was essentially in bed all day sleeping.  ED Provider first to bedside at: Approximately 7 PM.  CT Results received at: 7:46 PM    Thrombolytics:   Not given due to:   - Timing, with imaging demonstrating subacute infarct.    If treating with thrombolytics: Ensure SBP<180 and DBP<105 prior to treatment with thrombolytics.  Administering thrombolytics after treatment with IV labetalol, hydralazine, or nicardipine is reasonable once BP control is established.    Endovascular Retrieval:  Not initiated due to absence of proximal vessel occlusion    National Institutes of Health Stroke Scale (Baseline)  Time Performed: 7:15    Patient is unable to fully cooperate with neurologic exam.  He  is quite somnolent but able to follow some simple commands with his right upper and lower extremities.  He appears to have a dense left-sided hemiparesis.  He answers only his name to direct questioning but is able to follow some simple commands with his right side.        Medical Decision Makin-year-old  male with a complex medical history including coronary artery disease, history of TIA and CVAs, history of Emanuel's paralysis in 2023 requiring initiation of Keppra.  He is DNR DNI and was just discharged to transitional care facility 2 days ago following a hospitalization from  through  with generalized weakness and a COVID diagnosis.  He arrives due to altered mental status and left-sided weakness.  The timing on that is unclear.  Was initially reported that he was mentating normally at 3 PM.  The family reports that he was essentially in bed sleeping all day long.  On arrival, he is quite somnolent and appears to have a dense left hemiparesis.  Given the unclear timing, a tier 1 code stroke was called.  Noncontrast head CT demonstrates moderate to large area of acute to subacute ischemia in the right MCA distribution with several small curvilinear hyperdensities throughout.  CTA head shows marked narrowing of the proximal aspect of one of the right M 2 segments.  There is 70% narrowing of the proximal right ICA and 50 to 60% of the proximal left ICA and a number of other findings described above.  I spoke early on with stroke neurology.  He is on Plavix.  He is not felt to be a candidate for thrombolytics or endovascular retrieval given the CT findings and the CT suggesting a subacute process.  Given his history of seizures and Emanuel's paralysis, he requires transfer to a facility that has continuous EEG monitoring capabilities.  Unfortunately, there are no available beds at [facility] or [facility].  We were able to find an in system bed at [facility].  I  spoke with the hospitalist at that facility Dr. David who is agreeable with transfer.  The stroke neurologist has recommended high-dose IV Keppra load, 3 g are being given.  There has been no change in his mental status or neurologic condition.  I have spoken with the family members about his imaging results and plan and they are in agreement.  An MRI of the brain with and without contrast was eventually performed, while awaiting bed assignment and transfer.  This demonstrates a large subacute right MCA inferior distal division infarct with petechial hemorrhage.  There is a small subacute chronic left corona radiata infarct and other chronic changes.        Critical Care time:  was 75 minutes for this patient excluding procedures.    Diagnosis:    ICD-10-CM    1. Acute CVA (cerebrovascular accident) (H)  I63.9       2. History of dementia  Z86.59       3. Emanuel's paralysis (postepileptic) (H)  G83.84            Discharge Medications:  New Prescriptions    No medications on file          French Sosa MD  2/2/2024   French Sosa MD Isaacson, Brian A, MD  02/03/24 0007

## 2024-02-03 NOTE — ED NOTES
Report called to charge nurse Cameron RN @ [phone]. Requested that their floor contact daughter when patient arrives.

## 2024-02-04 NOTE — PROGRESS NOTES
OCCUPATIONAL THERAPY:  OT evaluation deferred. Pt transitioning to comfort care.Will d/c orders. Thank you.  Mary Reyes, OTR/L  2/4/2024

## 2024-02-04 NOTE — PROGRESS NOTES
[facility]    Medicine Progress Note - Hospitalist Service    Date of Admission:  2/3/2024    Assessment & Plan       Juan Miguel Campbell is a 88M transfer from [facility] for acute CVA, encephalopathy; pmhx includes CAD (s/p CABG), dementia, HLD/HTN< GERD, hypothyroid, todds paralysis, failure to thrive; admitted for acute R MCA CVA, encephalopathy.      2/4 :     Stable from neuro status stand point  Neuro following  On baby aspirin, keppra  Pt transitioning to comfort care     A/p :      #R MCA CVA  #encephalopathy  No evidence to suggest herniation, has possibility for seizures? Had small R subdural fluid collection, unclear significance.  Onset 3pm 2/2. NIHSS unreliable due to minimal responsive/interaction.  -telemetry  -TTE, to be discussed with family (possible consideration of hospice vs. Comfort care?)  -CT head repeat  -CK--early rule out seizure activity  -A1c  -lipid panel  -troponin trend  -ASA 81mg PO every day  -atorvastatin 80mg PO every day  -neurology consulted  -PT/OT/SLP  -PMR consult  -Palliative consult, evaluate for hospice vs. Comfort care     #hyponatremia  #hypochloremia  Likely reduce intake.  -NS bolus x1  -BMP trend     #elevated Creatinine  Unclear baseline.  -BMP trend     #hyperglycemia  IN acute setting  -BMP trend     #leukocytosis  In acute setting.  -CBC trend     #HTN  -permissive HTN, SBP goals of <220     #HLD  -atorvastatin 80mg PO every day          Diet: Regular Diet Adult    DVT Prophylaxis: Pneumatic Compression Devices  St Catheter: PRESENT, indication: End of Life  Lines: None     Cardiac Monitoring: None  Code Status: No CPR- Do NOT Intubate      Clinically Significant Risk Factors          # Hypocalcemia: Lowest Ca = 8.3 mg/dL in last 2 days, will monitor and replace as appropriate     # Hypoalbuminemia: Lowest albumin = 3.3 g/dL at 2/3/2024  6:58 AM, will monitor as appropriate  # Coagulation Defect: INR = 1.24 (Ref range: 0.85 - 1.15) and/or  PTT = 37 Seconds (Ref range: 22 - 38 Seconds), will monitor for bleeding               # Financial/Environmental Concerns: insurance inadequate (have applied for MA)         Disposition Plan      Expected Discharge Date: 02/06/2024      Destination: nursing home;long-term care facility              Alverto Boyer MD  Hospitalist Service  [facility]  Securely message with [employer] (more info)  Text page via [employer] Paging/Directory   ______________________________________________________________________      Physical Exam   Vital Signs: Temp: 98.5  F (36.9  C) Temp src: Oral BP: (!) 150/71 Pulse: 90   Resp: 16 SpO2: 100 % O2 Device: Nasal cannula Oxygen Delivery: 2 LPM  Weight: 147 lbs 11.33 oz    General Appearance:  Frail.  Turns his head towards me when I walked in the door but no clear interactions.  Respiratory: Mild rasping bilaterally  Cardiovascular: Sounds regular  GI: Soft no tenderness no masses.  Skin: No significant open areas.  Dry skin scattered senile purpura  Other:    Left arm fairly flaccid.  Some withdrawing to pain on the right no withdrawal to pain on the left.  No tremors or seizure-like activity noted          Medical Decision Making       60 MINUTES SPENT BY ME on the date of service doing chart review, history, exam, documentation & further activities per the note.  MANAGEMENT DISCUSSED with the following over the past 24 hours: rn, patient       Data

## 2024-02-04 NOTE — PLAN OF CARE
Problem: Palliative Care  Goal: Enhanced Quality of Life  Outcome: Progressing   Goal Outcome Evaluation:    Lethargic and awakens to continuous light stimulation. Occasionally reaches and grabs into the air. Repo q2. Observed to have physical pain with reposition. PRN morphine given once. Patient on 1L for comfort d/t shallow breathing. St placed in for end of life cares. Careplan ongoing.

## 2024-02-04 NOTE — PROGRESS NOTES
SPIRITUAL CARE NOTE    The family requested  to pray with patient and  administered an anointing.  The family was very appreciative of the spiritual services.  Patient remained asleep during the ritual.     Plan of Care: Will remain available for further support as patient/family needs/desires.     +TASNEEM Scales.       Cell:  [phone]

## 2024-02-04 NOTE — PLAN OF CARE
Goal Outcome Evaluation:    Problem: Adult Inpatient Plan of Care  Goal: Optimal Comfort and Wellbeing  Outcome: Progressing  Intervention: Monitor Pain and Promote Comfort  Recent Flowsheet Documentation  Taken 2/3/2024 1646 by Maia Wilson, RN  Pain Management Interventions:   medication (see MAR)   repositioned   quiet environment facilitated    Pt is lethargic, slept most of shift, minimal signs of pain.  Scored 2 on PAINAD- administered prn oral morphine x1.  Turn and repo q2h, oral hygiene cares done. Continue comfort cares.     Pt did not eat or drink, does not use call light. Rounded frequently.     Maia Wilson, RN

## 2024-02-04 NOTE — PLAN OF CARE
Goal Outcome Evaluation:         Problem: Adult Inpatient Plan of Care  Goal: Plan of Care Review    Outcome: Progressing     Problem: Adult Inpatient Plan of Care  Goal: Patient-Specific Goal (Individualized)    Outcome: Progressing     Problem: Adult Inpatient Plan of Care  Goal: Optimal Comfort and Wellbeing  Outcome: Progressing     Problem: Palliative Care  Goal: Enhanced Quality of Life  Outcome: Progressing        Pt is more alert today, calm and sleeping at intervals, no signs of pain noted.  Continues to be turn and repositioned every 2 hours, oral care provided.  Pt's spouse and daughter present at bedside.    Susy Min RN

## 2024-02-05 NOTE — PLAN OF CARE
Problem: Palliative Care  Goal: Enhanced Quality of Life  Outcome: Progressing   Goal Outcome Evaluation:    Patient continues to be obtunded. Slightly awakens when repositioned. LS are diminished with shallow breathing. Observed patient to be comfortable with no signs of pain other than when repositioned. St remains in place with little to no UOP.     Wife remains at bedside and verbalized being grateful to all staff that helps care for the patient. Careplan ongoing.

## 2024-02-05 NOTE — PLAN OF CARE
Goal Outcome Evaluation:    Alert at times.  Moans with movement. Turn and reposition every 2 hours.  Roxanol x 1 today and some Zofran.  Maciel has some very dark brown urine.  Awaiting placement.

## 2024-02-05 NOTE — PLAN OF CARE
Problem: Palliative Care  Goal: Enhanced Quality of Life  Outcome: Progressing  Intervention: Maximize Comfort  Recent Flowsheet Documentation  Taken 2/4/2024 1900 by Maia Wilson RN  Oral Care:   swabbed with sterile water   lip/mouth moisturizer applied  Taken 2/4/2024 1638 by Maia Wilson RN  Pain Management Interventions:   medication (see MAR)   repositioned  Taken 2/4/2024 1613 by Maia Wilson RN  Pain Management Interventions: quiet environment facilitated      Pt is more alert today, lethargic. Intermittent restlessness and grimacing, scored 4 on PAINAD- administered oral Morphine solution x2. Oral cares provided, turn and repo.     Wife is at bedside, provided emotional support and answered questions regarding medications    Maia Wilson RN.

## 2024-02-05 NOTE — PROGRESS NOTES
Care Management Follow Up    Length of Stay (days): 2    Expected Discharge Date: 02/06/2024     Concerns to be Addressed: discharge planning     Patient plan of care discussed at interdisciplinary rounds: Yes    Anticipated Discharge Disposition: Skilled Nursing Facility, Long Term Care, Hospice     Anticipated Discharge Services: None  Anticipated Discharge DME: None    Patient/family educated on Medicare website which has current facility and service quality ratings: yes  Education Provided on the Discharge Plan:    Patient/Family in Agreement with the Plan: yes    Referrals Placed by CM/SW:    Private pay costs discussed: Not applicable    Additional Information:  SW spoke with patient's daughter, Melvina, on the phone. SW [facility] her that [facility] with [facility]. SW spoke with Melvina about where she would like patient to go. SW sent referrals to discussed locations.     ADRIANA Mckeon

## 2024-02-05 NOTE — PROGRESS NOTES
Daily Progress Note        CODE STATUS:  No CPR- Do NOT Intubate    02/05/24  Assessment/Plan:  Juan Miguel Campbell is a 88M transfer from [facility] for acute CVA, encephalopathy; pmhx includes CAD (s/p CABG), dementia, HLD/HTN< GERD, hypothyroid, todds paralysis, failure to thrive. Currently under comfort care.    Right MCA CVA  Acute on chronic metabolic encephalopathy  Essential hypertension  Hyperlipidemia  DARLENE on CKD-3a  Mild hyponatremia    Patient was minimally responsive during my visit. Appeared comfortable. Breathing was unlabored. Opened his eyes on commands. Didn't follow any instructions from me. Wife at bedside.  Cont comfort care measures    Disposition; When placement found  Barrier to discharge; Needs hospice home or SNF with hospice services     LOS: 2 days     Subjective:  Interval History: Patient seen and examined. Notes, labs, imaging reports personally reviewed. Patient is new to me today. No acute issues overnight.     Review of Systems:   As mentioned in subjective.    Patient Active Problem List   Diagnosis    CVA (cerebral vascular accident) (H)       Scheduled Meds:   aspirin  81 mg Oral Daily    Or    aspirin  300 mg Rectal Daily    levETIRAcetam  500 mg Oral BID    sodium chloride (PF)  3 mL Intracatheter Q8H    sodium chloride (PF)  3 mL Intracatheter Q8H     Continuous Infusions:   - MEDICATION INSTRUCTIONS -      - MEDICATION INSTRUCTIONS -       PRN Meds:.acetaminophen **OR** acetaminophen, artificial saliva, atropine, bisacodyl, [START ON 2/6/2024] bisacodyl, artificial tears, lidocaine 4%, lidocaine (buffered or not buffered), LORazepam **OR** LORazepam, - MEDICATION INSTRUCTIONS -, - MEDICATION INSTRUCTIONS -, mineral oil-hydrophilic petrolatum, morphine **OR** morphine sulfate, morphine **OR** morphine sulfate, naloxone, naloxone, OLANZapine zydis, ondansetron **OR** ondansetron, polyethylene glycol, prochlorperazine **OR** prochlorperazine **OR** prochlorperazine, sodium chloride  "(PF), sodium chloride (PF)    Objective:  Vital signs in last 24 hours:           Intake/Output Summary (Last 24 hours) at 2/5/2024 1215  Last data filed at 2/4/2024 2300  Gross per 24 hour   Intake --   Output 300 ml   Net -300 ml       Physical Exam:    General: Not in obvious distress.  HEENT: NC, AT   Chest: Clear to auscultation bilaterally  Heart: S1S2 normal, regular. No M/R/G  Abdomen: Soft. NT, ND. Bowel sounds- active.  Extremities: No legs swelling  Neuro: Minimally responsive. Opens eyes intermittently.       Lab Results:(I have personally reviewed the results)    No results found for this or any previous visit (from the past 24 hour(s)).    All laboratory and imaging data in the past 24 hours reviewed  Serum Glucose range:   Recent Labs   Lab 02/03/24  1105 02/03/24  0658 02/02/24 1925 02/02/24  1919   * 124* 135* 133*     ABG: No lab results found in last 7 days.  CBC:   Recent Labs   Lab 02/03/24  0658 02/02/24 1925   WBC 17.2* 16.9*   HGB 13.2* 13.8   HCT 38.9* 41.3   MCV 86 88    358   NEUTROPHIL  --  79   LYMPH  --  12   MONOCYTE  --  8   EOSINOPHIL  --  0     Chemistry:   Recent Labs   Lab 02/03/24  0658 02/02/24 1925    133*   POTASSIUM 4.0 4.5   CHLORIDE 102 96*   CO2 26 25   BUN 22.7 22.9   CR 1.24* 1.29*   GFRESTIMATED 57* 54*   NICOLAS 8.3* 8.9   PROTTOTAL 7.1  --    ALBUMIN 3.3*  --    AST 38  --    ALT 41  --    ALKPHOS 161*  --    BILITOTAL 0.6  --      Coags:  Recent Labs   Lab 02/02/24 1925   INR 1.24*   PTT 37     Cardiac Markers:  No results for input(s): \"CKTOTAL\", \"TROPONINI\" in the last 168 hours.       CT Head w/o Contrast    Result Date: 2/3/2024  EXAM: CT HEAD W/O CONTRAST LOCATION: [facility] DATE: 2/3/2024 INDICATION: acute CVA, evaluate progression of intracranial hemorrhage  R subdural fluid on prior COMPARISON: MRI brain 02/02/2024 TECHNIQUE: Routine CT Head without IV contrast. Multiplanar reformats. Dose reduction techniques " were used. FINDINGS: INTRACRANIAL CONTENTS: Right MCA territory infarct with low-attenuation changes throughout the right frontal/parietal/temporal lobe junction remain stable compared to previous examination with subtle areas of high attenuation along the cortical margins of the infarct remains stable compared to prior examination considering differences in technique (CTA versus MRA) segment. The degree of right to left midline shift and mass effect upon the right lateral ventricle approximately 3 mm is unchanged. Thin low-attenuation subdural fluid collection overlying the right cerebral convexity measuring up to 3 mm unchanged when compared to MRI scan. Subacute infarct within the left corona radiata unchanged. Multifocal chronic infarcts including subcortical infarcts involving the right frontal lobe, posterior left frontal/parietal lobe junction, and left occipital lobe remain unchanged.  Nasopharynx is clear. Cerebellar hemispheres, fourth ventricle and region of CP angle cisterns are clear. Region of the sella and suprasellar cistern are clear.     IMPRESSION: 1.  Stable acute/subacute right MCA territory infarct with low-attenuation changes/gyral edema and areas of petechial hemorrhage involving the right frontal/parietal/temporal lobe infarct. Infarct territory and mass effect remain stable compared to MRI scan of 02/02/2024 with right to left shift of 3 mm. 2.  Thin low-attenuation subdural fluid collection overlying the right cerebral convexity measuring up to 3 mm remains unchanged. 3.  Small acute/subacute left corona radiata infarct unchanged. 4.  Multifocal chronic infarcts elsewhere in the deep white matter both cerebral hemispheres.    MR Brain w/o & w Contrast    Result Date: 2/2/2024  EXAM: MR BRAIN W/O and W CONTRAST LOCATION: [facility] DATE: 2/2/2024 INDICATION: Stroke COMPARISON:  Same day CTA head. CONTRAST: 7 mL Gadavist TECHNIQUE: Routine multiplanar multisequence  head MRI without and with intravenous contrast. FINDINGS: INTRACRANIAL CONTENTS: Large subacute right MCA inferior division infarct involving the parietal lobe, temporal lobe, and insula with petechial hemorrhage predominantly in the temporal lobe. Mild local mass effect and sulcal effacement in the areas of petechial hemorrhage. No edgar hematoma. No significant midline shift. Small subacute/chronic left corona radiata infarct. Chronic left MCA inferior division infarct involving the supramarginal gyrus. Thin right convexity subdural collection. Moderate generalized cerebral atrophy. No hydrocephalus. Normal position of the cerebellar tonsils. No pathologic contrast enhancement. SELLA: No abnormality accounting for technique. OSSEOUS STRUCTURES/SOFT TISSUES: Normal marrow signal. Intracranial vasculature better assessed on CTA. ORBITS: Prior left cataract surgery. Visualized portions of the orbits are otherwise unremarkable. SINUSES/MASTOIDS: Mild mucosal thickening scattered about the paranasal sinuses. No middle ear or mastoid effusion.     IMPRESSION: 1.  Large subacute right MCA inferior division infarct with petechial hemorrhage. No edgar hematoma or midline shift. 2.  Small subacute/chronic left corona radiata infarct in a region of chronic microvascular ischemic change. 3.  Chronic left MCA inferior division infarct involving the supramarginal gyrus. 4.  Thin right convexity subdural collection.    CTA Head Neck with Contrast    Result Date: 2/2/2024  EXAM: CTA HEAD NECK W CONTRAST LOCATION: [facility] DATE: 2/2/2024 INDICATION: Left-sided weakness COMPARISON: None. CONTRAST: 67mL Isovue 370 TECHNIQUE: Head and neck CT angiogram with IV contrast. Axial helical CT images of the head and neck vessels obtained during the arterial phase of intravenous contrast administration. Axial 2D reconstructed images and multiplanar 3D MIP reconstructed images of the head and neck vessels were  performed by the technologist. Dose reduction techniques were used. All stenosis measurements made according to NASCET criteria unless otherwise specified. FINDINGS: HEAD CTA: ANTERIOR CIRCULATION: Marked narrowing proximal aspect one of the right M2 segments. Slight asymmetry in the appearance of MCA distributions with slightly less opacified branches on the right. Moderate atherosclerotic changes cavernous and supraclinoid ICAs bilaterally. Short segment marked narrowing midportion of right A2. Standard [place] of Pelaez anatomy. POSTERIOR CIRCULATION: Short segment moderate narrowing right P2. Dominant right and smaller left vertebral artery contribute to a normal basilar artery. DURAL VENOUS SINUSES: Not well opacified to be evaluated. NECK CTA: RIGHT CAROTID: Approximately 70% narrowing proximal right ICA. Correlation with carotid ultrasound recommended. LEFT CAROTID: Approximately 50-60% narrowing. VERTEBRAL ARTERIES: No focal stenosis or dissection. Dominant right and smaller left vertebral arteries. AORTIC ARCH: Classic aortic arch anatomy with no significant stenosis at the origin of the great vessels. NONVASCULAR STRUCTURES: Unremarkable.     IMPRESSION: HEAD CTA: 1.  Marked narrowing proximal aspect one of the right M2 segments. Slight asymmetry in the appearance of MCA distributions with slightly less opacified branches on the right. 2.  Moderate atherosclerotic changes cavernous and supraclinoid ICAs bilaterally. 3.  Short segment marked narrowing midportion of right A2. 4.  Short segment moderate narrowing right P2. NECK CTA: 1.  Approximately 70% narrowing proximal right ICA. Correlation with carotid ultrasound recommended. 2.  Approximately 50-60% narrowing proximal left ICA. 3.  No definite hemodynamically significant narrowing vertebral arteries. Results were called to Dr. XIAO at 2/2/2024 7:46 PM CST.    CT Head w/o Contrast    Result Date: 2/2/2024  EXAM: CT HEAD W/O CONTRAST LOCATION: [facility] DATE: 2/2/2024 INDICATION: Left-sided weakness COMPARISON: None TECHNIQUE: Routine CT Head without IV contrast. Multiplanar reformats. Dose reduction techniques were used. FINDINGS: INTRACRANIAL CONTENTS: Moderate to large area of acute/subacute ischemia in right MCA distribution with several small curvilinear hyperdensities throughout. These probably represent scattered intravascular thromboses; petechial hemorrhage cannot be completely excluded. Mild chronic infarction left temporoparietal region and anterior aspect right basal ganglia. Chronic lacunar infarction left basal ganglia. Mild chronic infarction left occipital lobe. Moderate presumed chronic small vessel ischemic changes. Moderate generalized volume loss. No hydrocephalus. VISUALIZED ORBITS/SINUSES/MASTOIDS: No intraorbital abnormality. Moderate mucosal thickening anterior aspect ethmoid air cells and frontal sinuses. Small amount of fluid right maxillary sinus. No middle ear or mastoid effusion. BONES/SOFT TISSUES: No acute abnormality.     IMPRESSION: 1.  Moderate to large area of acute/subacute ischemia in right MCA distribution with several small curvilinear hyperdensities throughout. These probably represent scattered intravascular thromboses; petechial hemorrhage cannot be completely excluded. 2.  Chronic ischemic changes, as described. 3.  Moderate atrophy. Results were called to Dr. XIAO at 2/2/2024 7:46 PM CST.    XR Pelvis and Hip Right 1 View    Result Date: 1/22/2024  For Patients:  As a result of the 21st Century Cures Act, medical imaging exams and procedure reports are released immediately into your electronic medical record.  You may view this report before your referring provider.  If you have questions, please contact your health care provider. Indication: Fall with right hip pain Technique: Frontal view of the pelvis and lateral view of the right hip Comparison: None. Findings/Impression: No acute  fracture or malalignment. There are some degenerative changes of the visualized lower lumbar spine. No significant degenerative changes of the bilateral femoroacetabular joints. No concerning soft tissue abnormality. Vascular calcifications. Dictated by Raphael Nichols MD @ 1/22/2024 3:17:50 PM (Electronically Signed)    XR Chest B Read 2 views    Result Date: 1/22/2024  For Patients:  As a result of the 21st Century Cures Act, medical imaging exams and procedure reports are released immediately into your electronic medical record.  You may view this report before your referring provider.  If you have questions, please contact your health care provider. INDICATION: Cough TECHNIQUE: Chest 2 views. COMPARISON: None. FINDINGS: The cardiomediastinal silhouette is within normal limits. There are large U shaped clips projecting over the right lateral and upper anterior mediastinum. No focal airspace consolidation, pleural effusion, or pneumothorax. No displaced fracture. Postsurgical changes of median sternotomy.     No acute cardiopulmonary process. Dictated by Raphael Nichols MD @ 1/22/2024 11:40:22 AM (Electronically Signed)    CT Head w/o Contrast    Result Date: 1/22/2024  For Patients:  As a result of the 21st Century Cures Act, medical imaging exams and procedure reports are released immediately into your electronic medical record.  You may view this report before your referring provider.  If you have questions, please contact your health care provider. Indication: Dizziness, persistent Technique: Volumetric multidetector CT images of the head were obtained without the administration of low osmolar intravenous contrast. Comparison: MRI brain September 11, 2023 Findings: There is no intra-axial or extra-axial fluid collection. There is no mass effect or midline shift. There is age-related cortical atrophy with moderate sulcal widening and ex vacuo dilatation of the lateral ventricles. There is encephalomalacia of the left  frontal lobe and occipital lobes similar to previous exam. There is moderate chronic small vessel disease change within the subcortical and periventricular white matter. The remaining brain parenchyma is preserved in attenuation and gray-white differentiation. The orbits and their contents are grossly within normal limits. The bony calvarium is grossly intact. There is minimal mucosal thickening in bili secretion within the paranasal sinuses. The mastoid air cells are well aerated. Impression: Stable age related and remote ischemic changes of the brain without acute intracranial abnormality. Please note that all CT scans at this facility use dose modulation, iterative reconstruction, and/or weight-based dosing when appropriate to reduce radiation dose to as low as reasonably achievable. Dictated by Ochoa Bradley MD @ 1/22/2024 11:27:18 AM (Electronically Signed)      Latest radiology report personally reviewed.    Note created using dragon voice recognition software so sounds alike errors may have escaped editing.      02/05/2024   Matt Wolfe MD  Hospitalist, [facility]  Pager: [phone]

## 2024-02-06 NOTE — PROGRESS NOTES
[facility]    Hospitalist Progress Note    Assessment & Plan   88M transfer from [facility] for acute CVA, encephalopathy; pmhx includes CAD (s/p CABG), dementia, HLD/HTN< GERD, hypothyroid, todds paralysis, failure to thrive; admitted for acute R MCA CVA, encephalopathy. -- admitted on 2/3/24.    Impression:   Principal Problem:    CVA (cerebral vascular accident) (H)      Plan:  Continue comfort care    DVT Prophylaxis: comfort care  Code Status: No CPR- Do NOT Intubate    Disposition: Expected discharge patient will pass away here.     Lincoln Ferguson MD  Pager [phone]  Cell Phone [phone]  Text Page (7am to 6pm)    Interval History   No new complaints     Physical Exam         Pulse: 88   Resp: 16        Vitals:    02/03/24 0057   Weight: 67 kg (147 lb 11.3 oz)     Vital Signs with Ranges  Pulse:  [88] 88  Resp:  [16] 16  I/O last 3 completed shifts:  In: 60 [P.O.:60]  Out: 550 [Urine:550]    # Pain Assessment:      2/6/2024     3:07 PM   Current Pain Score   Patient currently in pain? no   Juan Miguel s pain level was assessed and he currently denies pain.        Constitutional: lethargic, cooperative, no apparent distress  Respiratory: Clear to auscultation bilaterally, no crackles or wheezing      Medications    - MEDICATION INSTRUCTIONS -      - MEDICATION INSTRUCTIONS -        aspirin  81 mg Oral Daily    Or    aspirin  300 mg Rectal Daily    levETIRAcetam  500 mg Oral BID    sodium chloride (PF)  3 mL Intracatheter Q8H       Data   Recent Labs   Lab 02/03/24  1105 02/03/24  0658 02/02/24  1925   WBC  --  17.2* 16.9*   HGB  --  13.2* 13.8   MCV  --  86 88   PLT  --  332 358   INR  --   --  1.24*   NA  --  136 133*   POTASSIUM  --  4.0 4.5   CHLORIDE  --  102 96*   CO2  --  26 25   BUN  --  22.7 22.9   CR  --  1.24* 1.29*   ANIONGAP  --  8 12   NICOLAS  --  8.3* 8.9   * 124* 135*   ALBUMIN  --  3.3*  --    PROTTOTAL  --  7.1  --    BILITOTAL  --  0.6  --    ALKPHOS  --   161*  --    ALT  --  41  --    AST  --  38  --        Imaging:   No results found for this or any previous visit (from the past 24 hour(s)).

## 2024-02-06 NOTE — PROGRESS NOTES
Care Management Follow Up    Length of Stay (days): 3    Expected Discharge Date: 02/06/2024     Concerns to be Addressed: discharge planning     Patient plan of care discussed at interdisciplinary rounds: Yes    Anticipated Discharge Disposition: Skilled Nursing Facility, Long Term Care, Hospice     Anticipated Discharge Services: None  Anticipated Discharge DME: None    Patient/family educated on Medicare website which has current facility and service quality ratings: yes  Education Provided on the Discharge Plan:    Patient/Family in Agreement with the Plan: yes    Referrals Placed by CM/SW:    Private pay costs discussed: Not applicable    Additional Information:  10:17 AM  DIANA called [facility] to follow up on referral. Maria Elena in admissions states they don't take [employer]. DIANA called [facility] and left VM. DIANA sent additional referrals to [facility], [facility], and [facility].     ADRIANA Mckeon

## 2024-02-06 NOTE — PLAN OF CARE
Problem: Palliative Care  Goal: Enhanced Quality of Life  Outcome: Progressing   Goal Outcome Evaluation:       No sign/symptom of pain noted, repositioned every two hrs. St cath patent, calm and restful.

## 2024-02-06 NOTE — PLAN OF CARE
Problem: Adult Inpatient Plan of Care  Goal: Optimal Comfort and Wellbeing  Outcome: Progressing   Goal Outcome Evaluation:    Rested comfortably all shift.  No agitation.  No indication of pain.  Repositioned every two hours with pillows.     Observed respiration rate of 16 and HR of 88.  St patent.     Opened eyes for a few minutes.  Was never awake or following directions enough to take oral Keppra.

## 2024-02-07 NOTE — PROGRESS NOTES
Care Management Follow Up    Length of Stay (days): 4    Expected Discharge Date: 02/07/2024     Concerns to be Addressed: discharge planning     Patient plan of care discussed at interdisciplinary rounds: Yes    Anticipated Discharge Disposition: Skilled Nursing Facility, Long Term Care, Hospice     Anticipated Discharge Services: None  Anticipated Discharge DME: None    Patient/family educated on Medicare website which has current facility and service quality ratings: yes  Education Provided on the Discharge Plan:    Patient/Family in Agreement with the Plan: yes    Referrals Placed by CM/SW:    Private pay costs discussed: Not applicable    Additional Information:  9:34 AM  SW called patient's daughter, Melvina, to discuss referrals, denials, and expanding LTC search. SW sent to more facilities.     1:30 PM   SW spoke with Melvina about the bed at [facility]. Melvina is appreciative and accepting of the bed. DIANA provided her the options for hospice. Melvina asked SW to just pick one, but noted that patient is a patient with [facility]. DIANA sent the referral to [facility].     ADRIANA Mckeon

## 2024-02-07 NOTE — PLAN OF CARE
Problem: Adult Inpatient Plan of Care  Goal: Plan of Care Review        Outcome: Progressing    Problem: Palliative Care  Goal: Enhanced Quality of Life  Outcome: Progressing    Pt was alert on/off today. Was able to get in AM Keppra and about 60 ml apple juice. Family at pt's side. Spouse to spend the night. St patent, dark beatrice urine. Pt showed some signs of pain. PRN Morphine given at 1135 and again at 6:17. Comfort cares.

## 2024-02-07 NOTE — PLAN OF CARE
Goal Outcome Evaluation:    Pt appeared to be comfortable this shift. Occasional moan/grimace with repositioning. Scheduled Morphine and Ativan given. No nausea noted. St patent and draining. Cares done. Reposition every 2hrs. Pt occasionally opens eyes but has mostly been somnolent. Wife at the bedside. Awaiting placement. Will continue to monitor.

## 2024-02-07 NOTE — PROGRESS NOTES
[facility]    Hospitalist Progress Note    Assessment & Plan   88M transfer from [facility] for acute CVA, encephalopathy; pmhx includes CAD (s/p CABG), dementia, HLD/HTN< GERD, hypothyroid, todds paralysis, failure to thrive; admitted for acute R MCA CVA, encephalopathy. -- admitted on 2/3/24.    Impression:   Principal Problem:    CVA (cerebral vascular accident) (H)    Unresponsive -- on comfort care    Plan:  Continue comfort care.  Discussed with wife.     DVT Prophylaxis: comfort care  Code Status: No CPR- Do NOT Intubate    Disposition: Expected discharge patient will pass away here in next 1-2 days.      Lincoln Ferguson MD  Pager [phone]  Cell Phone [phone]  Text Page (7am to 6pm)    Interval History   No new complaints.  Wife eported he was moaning yesterday, would like pain meds increased.      Physical Exam                      Vitals:    02/03/24 0057   Weight: 67 kg (147 lb 11.3 oz)     Vital Signs with Ranges     I/O last 3 completed shifts:  In: 60 [P.O.:60]  Out: 450 [Urine:450]    # Pain Assessment:      2/7/2024     8:15 AM   Current Pain Score   rFLACC pain score 1   Juan Miguel s pain level was assessed and he currently denies pain.        Constitutional: unresponsive  Respiratory: Clear to auscultation bilaterally, no crackles or wheezing  CV  -- reg S1S2  Neuro -- unresponsive, appears comfortable.     Medications    - MEDICATION INSTRUCTIONS -      - MEDICATION INSTRUCTIONS -        LORazepam  1 mg Sublingual Q4H    morphine sulfate  10 mg Oral Q4H       Data   Recent Labs   Lab 02/03/24  1105 02/03/24  0658 02/02/24  1925   WBC  --  17.2* 16.9*   HGB  --  13.2* 13.8   MCV  --  86 88   PLT  --  332 358   INR  --   --  1.24*   NA  --  136 133*   POTASSIUM  --  4.0 4.5   CHLORIDE  --  102 96*   CO2  --  26 25   BUN  --  22.7 22.9   CR  --  1.24* 1.29*   ANIONGAP  --  8 12   NICOLAS  --  8.3* 8.9   * 124* 135*   ALBUMIN  --  3.3*  --    PROTTOTAL  --  7.1  --     BILITOTAL  --  0.6  --    ALKPHOS  --  161*  --    ALT  --  41  --    AST  --  38  --        Imaging:   No results found for this or any previous visit (from the past 24 hour(s)).

## 2024-02-07 NOTE — PLAN OF CARE
Problem: Palliative Care  Goal: Enhanced Quality of Life  Outcome: Progressing   Goal Outcome Evaluation:       Continues to be somnolent but will moan when repositioned. PRN morphine x1 given for comfort. St in placed. Wife remains at bedside. No significant changes noted this shift.

## 2024-02-08 NOTE — PROGRESS NOTES
SPIRITUAL HEALTH SERVICES Progress Note  [facility], P1    Saw Juan Miguel's family in [facility]. Brief interaction to offer support. Juan Miguel is Quaker and I informed family that  will be available later this morning.     Joss Puente MDiv, [facility]  /Manager Spiritual Health Services  [phone]       Spiritual Health Services is available 24/7 for emergent requests and consults, either by paging the on-call  or by entering an ASAP/STAT consult in [facility], which will also page the on-call .

## 2024-02-08 NOTE — PLAN OF CARE
Goal Outcome Evaluation:    Pt appeared to be comfortable this shift. Scheduled Morphine and Ativan given. No nausea noted. St patent and draining. Incontinent of bowel. Reposition every 2hrs. No IV access. 2L O2 for comfort. Pt is somnolent. Possible placement tomorrow. Will continue to monitor.

## 2024-02-08 NOTE — PROGRESS NOTES
Care Management Follow Up    Length of Stay (days): 5    Expected Discharge Date: 02/08/2024     Concerns to be Addressed: discharge planning     Patient plan of care discussed at interdisciplinary rounds: Yes    Anticipated Discharge Disposition: Skilled Nursing Facility, Long Term Care, Hospice     Anticipated Discharge Services: None  Anticipated Discharge DME: None    Patient/family educated on Medicare website which has current facility and service quality ratings: yes  Education Provided on the Discharge Plan:    Patient/Family in Agreement with the Plan: yes    Referrals Placed by CM/SW:    Private pay costs discussed: Not applicable    Additional Information:  Per hospitalist, patient is actively dying and should not be transported. DIANA contacted [facility] to cancel patient's discharge. DIANA notified [facility] as well. DIANA contacted patient's daughter to update her that the discharge has been canceled.     ADRIANA Mckeon

## 2024-02-08 NOTE — SIGNIFICANT EVENT
MD DEATH PRONOUNCEMENT    Called to pronounce Juan Miguel Campbell dead.    Physical Exam: Unresponsive to noxious stimuli, Spontaneous respirations absent, Breath sounds absent, Carotid pulse absent, Heart sounds absent, Pupillary light reflex absent, and Corneal blink reflex absent    Patient was pronounced dead at 12:12 PM, February 8, 2024.

## 2024-02-08 NOTE — PLAN OF CARE
Goal Outcome Evaluation:    Patient appears very comfortable with occasional apneic breathing. Scheduled meds give as ordered. Turns and repositions Q2 hours with oral cares. St intact with minimal dark output.

## 2024-02-08 NOTE — PLAN OF CARE
Problem: Palliative Care  Goal: Enhanced Quality of Life  Intervention: Maximize Comfort  Recent Flowsheet Documentation  Taken 2024 1000 by Irma Miller, RN  Complementary Therapy: music therapy provided  Oral Care: swabbed with sterile water   Goal Outcome Evaluation:      Pt appeared comfortable, no signs of pain, minimal response all shift.      At noon, family contacted staff, said they feel he passed away.  RN noted, no respiration, no response at all, MD notified.    Post mortum checklist completed,  home notified, not donor eligible.  Awaiting arrival of  home.

## 2024-02-08 NOTE — PROGRESS NOTES
SPIRITUAL HEALTH SERVICES Note     Saw pt Juan Miguel Campbell and administered Muslim sacrament of anointing for the healing of the sick.    Fr. Hector Vigil

## 2024-02-08 NOTE — DISCHARGE SUMMARY
Death summary    Juan Miguel Campbell was a 88M transferred from [facility] for acute CVA, encephalopathy; pmhx includes CAD (s/p CABG), dementia, HLD/HTN, GERD, hypothyroid, todds paralysis, failure to thrive.      Neurology consulted. Family chose comfort care which was very reasonable. Comfort care measures started. Patient passed away comfortably today at around 12:12 PM. Family including patient's spouse, daughter, son-in-law and grandson were at bedside when he passed away. Condolences offered to the family members.     Causes of death:  1.Right MCA CVA  2.Acute on chronic metabolic encephalopathy  3.Failure to thrive    Other diagnoses  Essential hypertension  Hyperlipidemia  DARLENE on CKD-3a  Mild hyponatremia